# Patient Record
Sex: MALE | Race: WHITE | Employment: OTHER | URBAN - METROPOLITAN AREA
[De-identification: names, ages, dates, MRNs, and addresses within clinical notes are randomized per-mention and may not be internally consistent; named-entity substitution may affect disease eponyms.]

---

## 2017-02-08 ENCOUNTER — TRANSCRIBE ORDERS (OUTPATIENT)
Dept: ADMINISTRATIVE | Age: 57
End: 2017-02-08

## 2017-02-08 ENCOUNTER — APPOINTMENT (OUTPATIENT)
Dept: LAB | Age: 57
End: 2017-02-08
Payer: COMMERCIAL

## 2017-02-08 DIAGNOSIS — E78.5 HYPERLIPIDEMIA, UNSPECIFIED HYPERLIPIDEMIA TYPE: ICD-10-CM

## 2017-02-08 DIAGNOSIS — I10 UNSPECIFIED ESSENTIAL HYPERTENSION: ICD-10-CM

## 2017-02-08 DIAGNOSIS — E78.5 HYPERLIPIDEMIA, UNSPECIFIED HYPERLIPIDEMIA TYPE: Primary | ICD-10-CM

## 2017-02-08 DIAGNOSIS — I10 ESSENTIAL (PRIMARY) HYPERTENSION: ICD-10-CM

## 2017-02-08 DIAGNOSIS — R73.09 OTHER ABNORMAL GLUCOSE: ICD-10-CM

## 2017-02-08 DIAGNOSIS — E78.5 HYPERLIPIDEMIA: ICD-10-CM

## 2017-02-08 LAB
ALBUMIN SERPL BCP-MCNC: 4 G/DL (ref 3.5–5)
ALP SERPL-CCNC: 65 U/L (ref 46–116)
ALT SERPL W P-5'-P-CCNC: 41 U/L (ref 12–78)
ANION GAP SERPL CALCULATED.3IONS-SCNC: 4 MMOL/L (ref 4–13)
AST SERPL W P-5'-P-CCNC: 25 U/L (ref 5–45)
BILIRUB SERPL-MCNC: 0.65 MG/DL (ref 0.2–1)
BUN SERPL-MCNC: 16 MG/DL (ref 5–25)
CALCIUM SERPL-MCNC: 8.9 MG/DL (ref 8.3–10.1)
CHLORIDE SERPL-SCNC: 107 MMOL/L (ref 100–108)
CHOLEST SERPL-MCNC: 161 MG/DL (ref 50–200)
CO2 SERPL-SCNC: 27 MMOL/L (ref 21–32)
CREAT SERPL-MCNC: 1.18 MG/DL (ref 0.6–1.3)
CREAT UR-MCNC: 180 MG/DL
EST. AVERAGE GLUCOSE BLD GHB EST-MCNC: 123 MG/DL
GFR SERPL CREATININE-BSD FRML MDRD: >60 ML/MIN/1.73SQ M
GLUCOSE SERPL-MCNC: 101 MG/DL (ref 65–140)
HBA1C MFR BLD: 5.9 % (ref 4.2–6.3)
HDLC SERPL-MCNC: 54 MG/DL (ref 40–60)
LDLC SERPL DIRECT ASSAY-MCNC: 91 MG/DL (ref 0–100)
MICROALBUMIN UR-MCNC: 8.1 MG/L (ref 0–20)
MICROALBUMIN/CREAT 24H UR: 5 MG/G CREATININE (ref 0–30)
POTASSIUM SERPL-SCNC: 4.1 MMOL/L (ref 3.5–5.3)
PROT SERPL-MCNC: 7.5 G/DL (ref 6.4–8.2)
SODIUM SERPL-SCNC: 138 MMOL/L (ref 136–145)
TRIGL SERPL-MCNC: 153 MG/DL

## 2017-02-08 PROCEDURE — 36415 COLL VENOUS BLD VENIPUNCTURE: CPT

## 2017-02-08 PROCEDURE — 80061 LIPID PANEL: CPT

## 2017-02-08 PROCEDURE — 83036 HEMOGLOBIN GLYCOSYLATED A1C: CPT

## 2017-02-08 PROCEDURE — 82570 ASSAY OF URINE CREATININE: CPT

## 2017-02-08 PROCEDURE — 82043 UR ALBUMIN QUANTITATIVE: CPT

## 2017-02-08 PROCEDURE — 80053 COMPREHEN METABOLIC PANEL: CPT

## 2017-02-08 PROCEDURE — 83721 ASSAY OF BLOOD LIPOPROTEIN: CPT

## 2017-02-10 ENCOUNTER — GENERIC CONVERSION - ENCOUNTER (OUTPATIENT)
Dept: OTHER | Facility: OTHER | Age: 57
End: 2017-02-10

## 2017-02-15 ENCOUNTER — GENERIC CONVERSION - ENCOUNTER (OUTPATIENT)
Dept: OTHER | Facility: OTHER | Age: 57
End: 2017-02-15

## 2017-02-16 ENCOUNTER — ALLSCRIPTS OFFICE VISIT (OUTPATIENT)
Dept: OTHER | Facility: OTHER | Age: 57
End: 2017-02-16

## 2017-06-08 ENCOUNTER — GENERIC CONVERSION - ENCOUNTER (OUTPATIENT)
Dept: OTHER | Facility: OTHER | Age: 57
End: 2017-06-08

## 2017-08-07 DIAGNOSIS — E78.5 HYPERLIPIDEMIA: ICD-10-CM

## 2017-08-07 DIAGNOSIS — I10 ESSENTIAL (PRIMARY) HYPERTENSION: ICD-10-CM

## 2017-08-07 DIAGNOSIS — R73.03 PREDIABETES: ICD-10-CM

## 2017-08-10 ENCOUNTER — APPOINTMENT (OUTPATIENT)
Dept: LAB | Age: 57
End: 2017-08-10
Payer: COMMERCIAL

## 2017-08-10 ENCOUNTER — TRANSCRIBE ORDERS (OUTPATIENT)
Dept: ADMINISTRATIVE | Age: 57
End: 2017-08-10

## 2017-08-10 DIAGNOSIS — E78.5 HYPERLIPIDEMIA: ICD-10-CM

## 2017-08-10 DIAGNOSIS — I10 ESSENTIAL (PRIMARY) HYPERTENSION: ICD-10-CM

## 2017-08-10 DIAGNOSIS — R73.03 PREDIABETES: ICD-10-CM

## 2017-08-10 LAB
ALBUMIN SERPL BCP-MCNC: 3.9 G/DL (ref 3.5–5)
ALP SERPL-CCNC: 63 U/L (ref 46–116)
ALT SERPL W P-5'-P-CCNC: 26 U/L (ref 12–78)
ANION GAP SERPL CALCULATED.3IONS-SCNC: 6 MMOL/L (ref 4–13)
AST SERPL W P-5'-P-CCNC: 22 U/L (ref 5–45)
BILIRUB SERPL-MCNC: 1.07 MG/DL (ref 0.2–1)
BUN SERPL-MCNC: 22 MG/DL (ref 5–25)
CALCIUM SERPL-MCNC: 9.4 MG/DL (ref 8.3–10.1)
CHLORIDE SERPL-SCNC: 104 MMOL/L (ref 100–108)
CHOLEST SERPL-MCNC: 153 MG/DL (ref 50–200)
CO2 SERPL-SCNC: 28 MMOL/L (ref 21–32)
CREAT SERPL-MCNC: 1.17 MG/DL (ref 0.6–1.3)
CREAT UR-MCNC: 291 MG/DL
EST. AVERAGE GLUCOSE BLD GHB EST-MCNC: 128 MG/DL
GFR SERPL CREATININE-BSD FRML MDRD: 69 ML/MIN/1.73SQ M
GLUCOSE P FAST SERPL-MCNC: 99 MG/DL (ref 65–99)
HBA1C MFR BLD: 6.1 % (ref 4.2–6.3)
HDLC SERPL-MCNC: 52 MG/DL (ref 40–60)
LDLC SERPL DIRECT ASSAY-MCNC: 88 MG/DL (ref 0–100)
MICROALBUMIN UR-MCNC: 12.6 MG/L (ref 0–20)
MICROALBUMIN/CREAT 24H UR: 4 MG/G CREATININE (ref 0–30)
POTASSIUM SERPL-SCNC: 4.7 MMOL/L (ref 3.5–5.3)
PROT SERPL-MCNC: 7.4 G/DL (ref 6.4–8.2)
SODIUM SERPL-SCNC: 138 MMOL/L (ref 136–145)
TRIGL SERPL-MCNC: 111 MG/DL

## 2017-08-10 PROCEDURE — 83036 HEMOGLOBIN GLYCOSYLATED A1C: CPT

## 2017-08-10 PROCEDURE — 83721 ASSAY OF BLOOD LIPOPROTEIN: CPT

## 2017-08-10 PROCEDURE — 82570 ASSAY OF URINE CREATININE: CPT

## 2017-08-10 PROCEDURE — 80061 LIPID PANEL: CPT

## 2017-08-10 PROCEDURE — 36415 COLL VENOUS BLD VENIPUNCTURE: CPT

## 2017-08-10 PROCEDURE — 82043 UR ALBUMIN QUANTITATIVE: CPT

## 2017-08-10 PROCEDURE — 80053 COMPREHEN METABOLIC PANEL: CPT

## 2017-08-18 ENCOUNTER — ALLSCRIPTS OFFICE VISIT (OUTPATIENT)
Dept: OTHER | Facility: OTHER | Age: 57
End: 2017-08-18

## 2017-12-01 ENCOUNTER — GENERIC CONVERSION - ENCOUNTER (OUTPATIENT)
Dept: INTERNAL MEDICINE CLINIC | Facility: CLINIC | Age: 57
End: 2017-12-01

## 2017-12-10 NOTE — PROGRESS NOTES
Assessment    1  Hypertension (401 9) (I10)   2  Hyperlipidemia (272 4) (E78 5)   3  Prediabetes (790 29) (R73 03)   4  SVT (supraventricular tachycardia) (427 89) (I47 1)    #1 ocular issues-was originally referred to [facility] with 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-physician there did not feel this was a case it felt he may of had an area that had some scarring with resultant trichasis-they stopped suppressive doxycycline that he had been on any remains off that  #2 prediabetes-hemoglobin A1c climbed to 6 4-on metformin down to 6 1  #3 edema-etiology amlodipine-resolved off amlodipine  #4 hypertension-had climbed creatinine higher dose of lisinopril  He felt ACE inhibitor led to cough  Amlodipine caused edema  Now on low-dose Cozaar and doing well  He is a history of SVT but prefers not to use beta blockers  #5 abdominal pain-in 2 occasions have episodes lasting several hours without associated vomiting or diarrhea  Both episodes umbilical   Has not had any abdominal surgeries  Episodes have resolved  #6 inflammatory bowel disease had colonoscopy suspicious for mild Crohn's  Recent colonoscopy done in June shows mild Crohn's  Monitored by Dr Margarita Horn   They told him to continue current meds and have repeat Klonopin 2 years which would be December 2019  #7 mild elevation of creatinine-ultrasound the kidneys normal   Urinalysis normal   Urine for eosinophils negative  Normalized and low dose of ACE inhibitor  #8 paroxysmal atrial fibrillation-cardioverted in the past   Asymptomatic times months  #9 hyperlipidemia-stable on current dose of Crestor  Also on fish oil  #9 polyps of the colon-had a polyp removed at his colostomy in May 2016    Klonopin 2017 just done-as noted for repeat Klonopin 2 years    All other problems as per note of November 2007, 2000, October 1994 in [address] [facility] [address]: Generic Cozaar 50 mg daily, Crestor 5 mg 3 days per week,Delzicol-400 mg 3 times a day, metformin 500 mg twice a day, baby aspirin twice per week, vitamin B-12, multivitamin and fiber with breakfast, glucosamine 3 days per week, fish oil 5 days per week    Appointment in 6 months or prior chemistry profile, cholesterol profile, A1c and urine for microalbumin  Prior to the ensuing visit will also need B-12 level     Plan  Continue current medical regimen  Go for blood work prior to next visit  Call office if noting any chest pain or pressure with activity  Call office if noting any weakness of one arm or leg compared to the other  Call office if noting any numbness of one arm or leg compared to the other  Call office if noting any blurred or double vision     History of Present Illness  HPI: Doing well overall  We reviewed prediabetes versus diabetes  A1c is 6 1 and his urine for microalbumin is negative  He remains on metformin thousand milligrams a day    He is known hypertension  BP adequate control on current regimen  No adjustments made in that regard  He avoids all the decongestants  Denies simultaneous pounding of heart sweats and headache    Data follow-up visit with colorectal physician-colonoscopy done showing minimal colitis  He was told to stay on current dose of meds and have repeat colonoscopy 2 years  Other labs show cholesterol 153 HDL 52 LDL 88 triglycerides 111 creatinine 1 17  Urine for microalbumin negative  We reviewed complications of diabetes including microvascular and macrovascular and he understands  He sees ophthalmology yearly  He has not shown any evidence of microalbuminuria    Remains in a statin  He denies any major statin associated myalgias  This patient denies any systemic symptoms  Specifically there has been no evidence of fever, night sweats, significant weight loss or significant decrease in appetite  Review of Systems  Complete-Male:   Constitutional: No fever or chills, feels well, no tiredness, no recent weight gain or weight loss     Eyes: No complaints of eye pain, no red eyes, no discharge from eyes, no itchy eyes  ENT: no complaints of earache, no hearing loss, no nosebleeds, no nasal discharge, no sore throat, no hoarseness  Cardiovascular: lower extremity edema, but the heart rate was not slow, no chest pain, no intermittent leg claudication, the heart rate was not fast and no palpitations  Respiratory: No complaints of shortness of breath, no wheezing, no cough, no SOB on exertion, no orthopnea or PND  Gastrointestinal: abdominal pain and bloody stools, but no nausea, no vomiting, no constipation and no diarrhea  Genitourinary: No complaints of dysuria, no incontinence, no hesitancy, no nocturia, no genital lesion, no testicular pain  Musculoskeletal: No complaints of arthralgia, no myalgias, no joint swelling or stiffness, no limb pain or swelling  Integumentary: No complaints of skin rash or skin lesions, no itching, no skin wound, no dry skin  Neurological: No compliants of headache, no confusion, no convulsions, no numbness or tingling, no dizziness or fainting, no limb weakness, no difficulty walking  Psychiatric: Is not suicidal, no sleep disturbances, no anxiety or depression, no change in personality, no emotional problems  Endocrine: No complaints of proptosis, no hot flashes, no muscle weakness, no erectile dysfunction, no deepening of the voice, no feelings of weakness  Hematologic/Lymphatic: No complaints of swollen glands, no swollen glands in the neck, does not bleed easily, no easy bruising  Active Problems    1  Allergic rhinitis (477 9) (J30 9)   2  Azotemia (790 6) (R79 89)   3  Benign enlargement of prostate (600 00) (N40 0)   4  Colitis (558 9) (K52 9)   5  Colonoscopy (Fiberoptic) Screening   6  Edema (782 3) (R60 9)   7  Encounter for screening colonoscopy (V76 51) (Z12 11)   8  Hyperlipidemia (272 4) (E78 5)   9  Hypertension (401 9) (I10)   10  Palpitations (785 1) (R00 2)   11   SVT (supraventricular tachycardia) (427 89) (I47 1)    Past Medical History    1  History of Laceration Of Lower Leg (891 0)  Active Problems And Past Medical History Reviewed: The active problems and past medical history were reviewed and updated today  Surgical History  Surgical History Reviewed: The surgical history was reviewed and updated today  Family History  Mother    1  No pertinent family history    Social History    · Never smoker   · Occasional alcohol use  Social History Reviewed: The social history was reviewed and updated today  The social history was reviewed and is unchanged  Current Meds   1  Aspirin Low Dose 81 MG TABS; Therapy: (Recorded:50Cyt6456) to Recorded   2  Cozaar 50 MG Oral Tablet (Losartan Potassium); TAKE ONE TABLET BY MOUTH ONCE DAILY; Therapy: (Recorded:47Haa7633) to Recorded   3  Doxycycline Hyclate 20 MG Oral Tablet; Therapy: (Recorded:37Onz3387) to Recorded   4  Fluticasone Propionate 50 MCG/ACT Nasal Suspension; 2 squirts in each nostril once daily; Therapy: 03Qoe7003 to (Last Rx:65Muq3041)  Requested for: 24Gxq1443 Ordered   5  FreeStyle Test In Vitro Strip; patient tests blood sugars twice a day; Therapy: 66TAD3919 to (Last AC:55XPJ7910)  Requested for: 92DHQ6539 Ordered   6  Losartan Potassium 50 MG Oral Tablet; take 1 tablet by mouth daily; Therapy: 29SPZ8992 to (Evaluate:05Nov2017)  Requested for: 96DUD7535; Last Rx:06Dgc1259   Ordered   7  MetFORMIN HCl - 500 MG Oral Tablet; TAKE 1 TABLET TWICE DAILY; Therapy: (Recorded:75Zsx4439) to Recorded   8  Multivitamins TABS; Therapy: (Recorded:04Klo4469) to Recorded   9  Probiotic CAPS; Therapy: (Recorded:88Brr2628) to Recorded   10  Vitamin B-12 TABS; Therapy: (Recorded:54Hoa5265) to Recorded  Medication List Reviewed: The medication list was reviewed and updated today  Allergies    1   No Known Drug Allergies    Vitals  Vital Signs    Recorded: 18Aug2017 08:34AM Recorded: 18Aug2017 08:03AM   Heart Rate 68    Respiration 14 Systolic 589, RLE, Sitting    Diastolic 76, RLE, Sitting    Height  6 ft 1 in   Weight  196 lb 8 oz   BMI Calculated  25 93   BSA Calculated  2 14     Physical Exam    Constitutional   General appearance: No acute distress, well appearing and well nourished  Head and Face   Head and face: Normal     Palpation of the face and sinuses: No sinus tenderness  Eyes   Conjunctiva and lids: No erythema, swelling or discharge  Pupils and irises: Equal, round, reactive to light  Ears, Nose, Mouth, and Throat   External inspection of ears and nose: Normal     Otoscopic examination: Tympanic membranes translucent with normal light reflex  Canals patent without erythema  Hearing: Normal     Nasal mucosa, septum, and turbinates: Normal without edema or erythema  Lips, teeth, and gums: Normal, good dentition  Oropharynx: Normal with no erythema, edema, exudate or lesions  Neck   Neck: Supple, symmetric, trachea midline, no masses  Thyroid: Normal, no thyromegaly  Pulmonary   Respiratory effort: No increased work of breathing or signs of respiratory distress  Percussion of chest: Normal     Palpation of chest: Normal     Auscultation of lungs: Clear to auscultation  Cardiovascular   Palpation of heart: Normal PMI, no thrills  Auscultation of heart: Normal rate and rhythm, normal S1 and S2, no murmurs  Carotid pulses: 2+ bilaterally  Abdominal aorta: Normal     Femoral pulses: 2+ bilaterally  Pedal pulses: 2+ bilaterally  Peripheral vascular exam: Normal     Examination of extremities for edema and/or varicosities: Normal     Chest   Breasts: Normal, no dimpling or skin changes appreciated  Palpation of breasts and axillae: Normal, no masses palpated  Chest: Normal     Abdomen   Abdomen: Non-tender, no masses  Liver and spleen: No hepatomegaly or splenomegaly  Examination for hernias: No hernias appreciated      Anus, perineum, and rectum: Normal sphincter tone, no masses, no prolapse  Stool sample for occult blood: Negative  Genitourinary   Scrotal contents: Normal testes, no masses  Penis: Normal, no lesions  Lymphatic   Palpation of lymph nodes in neck: No lymphadenopathy  Palpation of lymph nodes in axillae: No lymphadenopathy  Palpation of lymph nodes in groin: No lymphadenopathy  Palpation of lymph nodes in other areas: No lymphadenopathy  Musculoskeletal   Gait and station: Normal     Inspection/palpation of digits and nails: Normal without clubbing or cyanosis  Inspection/palpation of joints, bones, and muscles: Normal     Range of motion: Normal     Stability: Normal     Muscle strength/tone: Normal     Skin   Skin and subcutaneous tissue: Normal without rashes or lesions  Palpation of skin and subcutaneous tissue: Normal turgor  Neurologic   Cranial nerves: Cranial nerves 2-12 intact  Reflexes: 2+ and symmetric  Sensation: No sensory loss  Psychiatric   Judgment and insight: Normal     Orientation to person, place and time: Normal     Recent and remote memory: Intact      Mood and affect: Normal        Signatures   Electronically signed by : KEE Breaux ; Aug 18 2017  8:42AM EST                       (Author)

## 2018-01-13 VITALS
HEIGHT: 73 IN | SYSTOLIC BLOOD PRESSURE: 124 MMHG | WEIGHT: 196.5 LBS | BODY MASS INDEX: 26.04 KG/M2 | HEART RATE: 68 BPM | RESPIRATION RATE: 14 BRPM | DIASTOLIC BLOOD PRESSURE: 76 MMHG

## 2018-01-13 VITALS
WEIGHT: 201.38 LBS | HEIGHT: 73 IN | DIASTOLIC BLOOD PRESSURE: 76 MMHG | RESPIRATION RATE: 14 BRPM | BODY MASS INDEX: 26.69 KG/M2 | HEART RATE: 68 BPM | SYSTOLIC BLOOD PRESSURE: 126 MMHG

## 2018-02-16 LAB
25(OH)D3 SERPL-MCNC: 40 NG/ML (ref 30–100)
ALBUMIN SERPL-MCNC: 4.4 G/DL (ref 3.6–5.1)
ALBUMIN/CREAT UR: 2 MCG/MG CREAT
ALBUMIN/GLOB SERPL: 1.9 (CALC) (ref 1–2.5)
ALP SERPL-CCNC: 63 U/L (ref 40–115)
ALT SERPL-CCNC: 24 U/L (ref 9–46)
AST SERPL-CCNC: 19 U/L (ref 10–35)
BILIRUB SERPL-MCNC: 0.6 MG/DL (ref 0.2–1.2)
BUN SERPL-MCNC: 23 MG/DL (ref 7–25)
BUN/CREAT SERPL: NORMAL (CALC) (ref 6–22)
CALCIUM SERPL-MCNC: 9.3 MG/DL (ref 8.6–10.3)
CHLORIDE SERPL-SCNC: 103 MMOL/L (ref 98–110)
CHOLEST SERPL-MCNC: 153 MG/DL
CO2 SERPL-SCNC: 28 MMOL/L (ref 20–31)
CREAT SERPL-MCNC: 1.11 MG/DL (ref 0.7–1.33)
CREAT UR-MCNC: 213 MG/DL (ref 20–370)
GLOBULIN SER CALC-MCNC: 2.3 G/DL (CALC) (ref 1.9–3.7)
GLUCOSE SERPL-MCNC: 98 MG/DL (ref 65–99)
HBA1C MFR BLD: 5.7 % OF TOTAL HGB
HDLC SERPL-MCNC: 45 MG/DL
LDLC SERPL DIRECT ASSAY-MCNC: 78 MG/DL
MICROALBUMIN UR-MCNC: 0.5 MG/DL
POTASSIUM SERPL-SCNC: 4.7 MMOL/L (ref 3.5–5.3)
PROT SERPL-MCNC: 6.7 G/DL (ref 6.1–8.1)
PSA SERPL-MCNC: 0.6 NG/ML
SL AMB EGFR AFRICAN AMERICAN: 85 ML/MIN/1.73M2
SL AMB EGFR NON AFRICAN AMERICAN: 73 ML/MIN/1.73M2
SODIUM SERPL-SCNC: 138 MMOL/L (ref 135–146)
TRIGL SERPL-MCNC: 167 MG/DL

## 2018-02-17 PROBLEM — I47.10 SVT (SUPRAVENTRICULAR TACHYCARDIA): Status: ACTIVE | Noted: 2017-02-16

## 2018-02-17 PROBLEM — I47.1 SVT (SUPRAVENTRICULAR TACHYCARDIA) (HCC): Status: ACTIVE | Noted: 2017-02-16

## 2018-02-17 PROBLEM — I47.10 SVT (SUPRAVENTRICULAR TACHYCARDIA): Chronic | Status: ACTIVE | Noted: 2017-02-16

## 2018-02-17 PROBLEM — I47.1 SVT (SUPRAVENTRICULAR TACHYCARDIA) (HCC): Chronic | Status: ACTIVE | Noted: 2017-02-16

## 2018-02-22 ENCOUNTER — OFFICE VISIT (OUTPATIENT)
Dept: INTERNAL MEDICINE CLINIC | Facility: CLINIC | Age: 58
End: 2018-02-22
Payer: COMMERCIAL

## 2018-02-22 VITALS
WEIGHT: 198.8 LBS | HEIGHT: 73 IN | RESPIRATION RATE: 14 BRPM | HEART RATE: 72 BPM | BODY MASS INDEX: 26.35 KG/M2 | DIASTOLIC BLOOD PRESSURE: 78 MMHG | SYSTOLIC BLOOD PRESSURE: 120 MMHG

## 2018-02-22 DIAGNOSIS — E78.5 HYPERLIPIDEMIA, UNSPECIFIED HYPERLIPIDEMIA TYPE: Primary | ICD-10-CM

## 2018-02-22 DIAGNOSIS — I10 HYPERTENSION, UNSPECIFIED TYPE: ICD-10-CM

## 2018-02-22 DIAGNOSIS — R73.03 PREDIABETES: ICD-10-CM

## 2018-02-22 DIAGNOSIS — I47.1 SVT (SUPRAVENTRICULAR TACHYCARDIA) (HCC): Chronic | ICD-10-CM

## 2018-02-22 PROCEDURE — 99214 OFFICE O/P EST MOD 30 MIN: CPT | Performed by: INTERNAL MEDICINE

## 2018-02-22 RX ORDER — MESALAMINE 400 MG/1
800 CAPSULE, DELAYED RELEASE ORAL 3 TIMES DAILY
Refills: 4 | COMMUNITY
Start: 2017-11-28 | End: 2018-12-19 | Stop reason: SDUPTHER

## 2018-02-22 RX ORDER — LOSARTAN POTASSIUM 50 MG/1
1 TABLET ORAL DAILY
COMMUNITY
End: 2018-10-24 | Stop reason: SDUPTHER

## 2018-02-22 RX ORDER — DOXYCYCLINE HYCLATE 20 MG
TABLET ORAL
COMMUNITY
End: 2018-09-13 | Stop reason: ALTCHOICE

## 2018-02-22 RX ORDER — FLUTICASONE PROPIONATE 50 MCG
SPRAY, SUSPENSION (ML) NASAL
COMMUNITY
Start: 2015-04-09

## 2018-02-22 RX ORDER — CYCLOSPORINE 0.5 MG/ML
EMULSION OPHTHALMIC ONCE
Refills: 3 | COMMUNITY
Start: 2017-12-14 | End: 2021-12-15 | Stop reason: ALTCHOICE

## 2018-02-22 RX ORDER — FLUOROURACIL 50 MG/G
CREAM TOPICAL
Refills: 1 | COMMUNITY
Start: 2017-12-28 | End: 2018-09-13 | Stop reason: ALTCHOICE

## 2018-02-22 NOTE — PROGRESS NOTES
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 -physician there felt this was not the case and he had an area of scarring with resultanttrichasis- -she had been on suppressive doxycycline but this was stopped  2  Pre diabetes -A1c improved to 5 7  Continue pro pH diet and exercise  He is also on metformin 1 gram daily  3  Edema -etiology amlodipine -resolved off amlodipine  4  Hypertension -creatinine had climbed on higher dose of ACE-inhibitor  ACE-inhibitor also let the cough  Amlodipine led to edema  Now stable on low-dose Cozaar and doing well  He has a history of SVT but prefers not to use beta-blocker  5  Abdominal pain -on 2 occasions had episodes lasting several hours without associated vomiting or diarrhea  Both her umbilical   He has not had any abdominal surgeries  Has had no further symptoms  6  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  Had most recent colonoscopy 2017  Subsequent colonoscopy will be due in approximately December 2019  His dose of meds has been decreased  7  Mild elevation of creatinine -ultrasound the kidneys normal   Urine for eosinophiles negative  Urinalysis normal   Creatinine normalized on lower dose of ACE-inhibitor as noted now on angiotensin receptor blocker  8  Paroxysmal atrial fibrillation -cardioverted in the past   Asymptomatic for several years  9  Hyperlipidemia -stable on current dose of Crestor  Also on fish oil  10  Polyps of the colon-had a polyp removed at his colonoscopy in May 2016  Colonoscopy done in 2017   Continues with periodic assessment via Colorectal group    All other problems as per note of November 2007, 2000, October 1990 04/19/1993       MEDICAL REGIMEN:      Generic Cozaar 50 milligrams daily, Crestor 5 milligrams 3 days per weekDe,lzicol- 400 milligrams t i d , metformin 500 milligrams b i d , baby aspirin twice per week, vitamin B12 a1000 micrograms daily, multivitamin with fiber with breakfast, glucosamine and fish oil    Appointment in 6 months with prior chemistry profile, cholesterol profile, A1c  No problem-specific Assessment & Plan notes found for this encounter  Diagnoses and all orders for this visit:    Hyperlipidemia, unspecified hyperlipidemia type  -     Comprehensive metabolic panel; Future  -     Cholesterol, total; Future  -     LDL cholesterol, direct; Future  -     HDL cholesterol; Future  -     Triglycerides; Future  -     Hemoglobin A1c; Future  -     Microalbumin,Urine  -     Vitamin B12; Future  -     Comprehensive metabolic panel  -     Cholesterol, total  -     LDL cholesterol, direct  -     HDL cholesterol  -     Triglycerides  -     Hemoglobin A1c  -     Vitamin B12    Prediabetes  -     Comprehensive metabolic panel; Future  -     Cholesterol, total; Future  -     LDL cholesterol, direct; Future  -     HDL cholesterol; Future  -     Triglycerides; Future  -     Hemoglobin A1c; Future  -     Microalbumin,Urine  -     Vitamin B12; Future  -     Comprehensive metabolic panel  -     Cholesterol, total  -     LDL cholesterol, direct  -     HDL cholesterol  -     Triglycerides  -     Hemoglobin A1c  -     Vitamin B12    Hypertension, unspecified type  -     Comprehensive metabolic panel; Future  -     Cholesterol, total; Future  -     LDL cholesterol, direct; Future  -     HDL cholesterol; Future  -     Triglycerides; Future  -     Hemoglobin A1c; Future  -     Microalbumin,Urine  -     Vitamin B12; Future  -     Comprehensive metabolic panel  -     Cholesterol, total  -     LDL cholesterol, direct  -     HDL cholesterol  -     Triglycerides  -     Hemoglobin A1c  -     Vitamin B12    SVT (supraventricular tachycardia) (HCC)    Other orders  -     aspirin (ASPIRIN LOW DOSE) 81 MG tablet; Take by mouth  -     losartan (COZAAR) 50 mg tablet;  Take 1 tablet by mouth daily  -     RESTASIS 0 05 % ophthalmic emulsion; INSTILL 1 DROP INTO THE AFFECTED EYE(S) TWICE A DAY  -     doxycycline (PERIOSTAT) 20 MG tablet; Take by mouth  -     fluorouracil (EFUDEX) 5 % cream; APPLY TO AFFECTED AREA TWICE A DAY FOR 2 WEEKS  -     fluticasone (FLONASE) 50 mcg/act nasal spray; into each nostril  -     DELZICOL 400 MG; Take 800 mg by mouth 3 (three) times a day  -     metFORMIN (GLUCOPHAGE) 500 mg tablet; Take 500 mg by mouth 2 (two) times a day  -     metroNIDAZOLE (METROCREAM) 0 75 % cream; APPLY TO MID FACE ONCE TO TWICE DAILY          Subjective:      Patient ID: Ivan Platt is a 62 y o  male  He is overall relatively stable  He has known pre diabetes  A1c has improved from 6 1 down to 5 7  Gained 5 pounds over the winter which she typically does not triglycerides of climb  Alcohol intake is same  He is stable from his Colorectal viewpoint  His dose of meds has decreased by 50%  Other labs done show an LDL is 78 cholesterol 153 triglycerides 167 creatinine stable at 1 11  Urine for microalbumin negative  HDL 5  PSA 0 6  Vitamin-D therapeutic at 40  We talked about the new zoster vaccine  Definitive data regarding CAD should be available by the time the since patient is age 61 when he will need a vaccine  He did receive annual influenza vaccine  As noted his most recent colonoscopy was in June of 2017 any was told he does not need 1 for 2 years     He has known hypertension  BP adequately controlled on current regimen  Avoiding salt and decongestants  Denies hematemesis pounding of his heart sweats and headache  At this point will continue current dose of angiotensin receptor blocker  He has not had any recurrence of his arrhythmia  He denies any palpitations syncope or near syncope  In regular rhythm on examination today     He knows his preferred analgesic agent is acetaminophen as opposed to nonsteroidals because of his hypertension, prior mild elevation of creatinine  He stays well hydrated with activity  This patient denies any systemic symptoms  Specifically there has been no evidence of fever, night sweats, significant weight loss or significant decrease in appetite  The following portions of the patient's history were reviewed and updated as appropriate: current medications, past family history, past medical history, past social history, past surgical history and problem list     Review of Systems   Constitutional: Negative  Respiratory: Negative  Cardiovascular: Negative  Gastrointestinal: Negative  Endocrine: Negative  Genitourinary: Negative  Nocturia x2   Musculoskeletal: Negative  Neurological: Negative  Hematological: Negative  Psychiatric/Behavioral: Negative  Objective:      /78   Pulse 72   Resp 14   Ht 6' 1" (1 854 m)   Wt 90 2 kg (198 lb 12 8 oz)   BMI 26 23 kg/m²          Physical Exam   Constitutional: He is oriented to person, place, and time  He appears well-developed and well-nourished  No distress  HENT:   Head: Normocephalic and atraumatic  Right Ear: External ear normal    Left Ear: External ear normal    Nose: Nose normal    Mouth/Throat: Oropharynx is clear and moist  No oropharyngeal exudate  Eyes: Conjunctivae and EOM are normal  Pupils are equal, round, and reactive to light  Right eye exhibits no discharge  Left eye exhibits no discharge  No scleral icterus  Neck: Normal range of motion  Neck supple  No JVD present  No tracheal deviation present  No thyromegaly present  Cardiovascular: Normal rate, regular rhythm, normal heart sounds and intact distal pulses  Exam reveals no gallop and no friction rub  No murmur heard  Pulmonary/Chest: Effort normal and breath sounds normal  No stridor  No respiratory distress  He has no wheezes  He exhibits no tenderness  Abdominal: Soft  Bowel sounds are normal  He exhibits no distension and no mass  There is no tenderness  There is no rebound and no guarding  Genitourinary: Rectum normal and penis normal  Rectal exam shows guaiac negative stool  Genitourinary Comments:  Minimal enlargement of prostate without nodules   Musculoskeletal: Normal range of motion  He exhibits no edema, tenderness or deformity  Lymphadenopathy:     He has no cervical adenopathy  Neurological: He is alert and oriented to person, place, and time  He has normal reflexes  He displays normal reflexes  No cranial nerve deficit  He exhibits normal muscle tone  Coordination normal    Skin: Skin is warm and dry  No rash noted  He is not diaphoretic  No erythema  No pallor  Psychiatric: He has a normal mood and affect  His behavior is normal  Judgment and thought content normal    Vitals reviewed

## 2018-04-13 DIAGNOSIS — E11.9 TYPE 2 DIABETES MELLITUS WITHOUT COMPLICATION, WITHOUT LONG-TERM CURRENT USE OF INSULIN (HCC): Primary | ICD-10-CM

## 2018-09-07 LAB
ALBUMIN SERPL-MCNC: 4.4 G/DL (ref 3.6–5.1)
ALBUMIN/GLOB SERPL: 1.7 (CALC) (ref 1–2.5)
ALP SERPL-CCNC: 59 U/L (ref 40–115)
ALT SERPL-CCNC: 23 U/L (ref 9–46)
AST SERPL-CCNC: 19 U/L (ref 10–35)
BILIRUB SERPL-MCNC: 0.9 MG/DL (ref 0.2–1.2)
BUN SERPL-MCNC: 19 MG/DL (ref 7–25)
BUN/CREAT SERPL: NORMAL (CALC) (ref 6–22)
CALCIUM SERPL-MCNC: 9.4 MG/DL (ref 8.6–10.3)
CHLORIDE SERPL-SCNC: 104 MMOL/L (ref 98–110)
CHOLEST SERPL-MCNC: 181 MG/DL
CO2 SERPL-SCNC: 31 MMOL/L (ref 20–32)
CREAT SERPL-MCNC: 1.01 MG/DL (ref 0.7–1.33)
GLOBULIN SER CALC-MCNC: 2.6 G/DL (CALC) (ref 1.9–3.7)
GLUCOSE SERPL-MCNC: 98 MG/DL (ref 65–99)
HBA1C MFR BLD: 5.6 % OF TOTAL HGB
HDLC SERPL-MCNC: 48 MG/DL
LDLC SERPL DIRECT ASSAY-MCNC: 103 MG/DL
POTASSIUM SERPL-SCNC: 4.5 MMOL/L (ref 3.5–5.3)
PROT SERPL-MCNC: 7 G/DL (ref 6.1–8.1)
SL AMB EGFR AFRICAN AMERICAN: 95 ML/MIN/1.73M2
SL AMB EGFR NON AFRICAN AMERICAN: 82 ML/MIN/1.73M2
SODIUM SERPL-SCNC: 141 MMOL/L (ref 135–146)
TRIGL SERPL-MCNC: 154 MG/DL
VIT B12 SERPL-MCNC: 1905 PG/ML (ref 200–1100)

## 2018-09-13 ENCOUNTER — OFFICE VISIT (OUTPATIENT)
Dept: INTERNAL MEDICINE CLINIC | Facility: CLINIC | Age: 58
End: 2018-09-13
Payer: COMMERCIAL

## 2018-09-13 VITALS
BODY MASS INDEX: 26.37 KG/M2 | SYSTOLIC BLOOD PRESSURE: 120 MMHG | HEIGHT: 73 IN | DIASTOLIC BLOOD PRESSURE: 78 MMHG | RESPIRATION RATE: 14 BRPM | WEIGHT: 199 LBS | HEART RATE: 72 BPM

## 2018-09-13 DIAGNOSIS — N40.0 BENIGN PROSTATIC HYPERPLASIA, UNSPECIFIED WHETHER LOWER URINARY TRACT SYMPTOMS PRESENT: ICD-10-CM

## 2018-09-13 DIAGNOSIS — R73.03 PREDIABETES: ICD-10-CM

## 2018-09-13 DIAGNOSIS — E78.5 HYPERLIPIDEMIA, UNSPECIFIED HYPERLIPIDEMIA TYPE: Primary | ICD-10-CM

## 2018-09-13 DIAGNOSIS — I10 HYPERTENSION, UNSPECIFIED TYPE: ICD-10-CM

## 2018-09-13 DIAGNOSIS — C44.90 NONMELANOMA SKIN CANCER: Chronic | ICD-10-CM

## 2018-09-13 PROCEDURE — 99214 OFFICE O/P EST MOD 30 MIN: CPT | Performed by: INTERNAL MEDICINE

## 2018-09-13 PROCEDURE — 3078F DIAST BP <80 MM HG: CPT | Performed by: INTERNAL MEDICINE

## 2018-09-13 PROCEDURE — 3074F SYST BP LT 130 MM HG: CPT | Performed by: INTERNAL MEDICINE

## 2018-09-13 PROCEDURE — 3008F BODY MASS INDEX DOCD: CPT | Performed by: INTERNAL MEDICINE

## 2018-09-13 RX ORDER — DIPHENOXYLATE HYDROCHLORIDE AND ATROPINE SULFATE 2.5; .025 MG/1; MG/1
1 TABLET ORAL DAILY
COMMUNITY
End: 2020-02-12 | Stop reason: ALTCHOICE

## 2018-09-13 RX ORDER — ROSUVASTATIN CALCIUM 5 MG/1
5 TABLET, COATED ORAL
COMMUNITY
End: 2019-03-14 | Stop reason: SDUPTHER

## 2018-09-13 RX ORDER — LANOLIN ALCOHOL/MO/W.PET/CERES
CREAM (GRAM) TOPICAL DAILY
COMMUNITY

## 2018-09-13 NOTE — PROGRESS NOTES
Assessment/Plan:   1  Health maintenance -will be receiving influenza vaccine outside the office  2  Pre diabetes -A1c is improved all the way down to 5 6  Continue appropriate diet and exercise  Continue metformin 1    gram daily  For repeat A1c prior to next visit  3  Nonmelanoma skin cancer -await formal pathology report from Dermatology -she is scheduled for repeat surgery because margins are not clear  4  Hypertension -creatinine is climb slightly on higher dose of ACE-inhibitor  ACE-inhibitor also led to cough  Amlodipine led to edema  Now stable on low-dose angiotensin receptor blocker and doing well  He has a history of  SVT but prefers not to use beta-blocker   5  Ocular issues -originally referred [facility] can clinic with 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 -physician there felt this was not the case and he had an area of scarring with resultanttrichasis- -she had been on suppressive doxycycline but this was stopped  6  Edema -etiology amlodipine -resolved off amlodipin  7  Abdominal pain -on 2 occasions had episodes lasting several hours without associated vomiting or diarrhea  Both her umbilical   He has not had any abdominal surgeries  Has had no further symptoms  8 inflammatorybowel disease with endoscopy suspicious for mild clonus  Had most recent colonoscopy 2017  Subsequent colonoscopy will be due in approximately December 2019  His dose of meds has been decreased  9  Mild elevation of creatinine -ultrasound the kidneys normal   Urine for eosinophiles negative  Urinalysis normal   Creatinine normalized on lower dose of ACE-inhibitor as noted now on angiotensin receptor blocker  10  Paroxysmal atrial fibrillation -cardioverted in the past   Asymptomatic for several years  11  Hyperlipidemia -stable on current dose of Crestor  Also on fish oil -she will increase to 1 gram daily because of elevated triglycerides  He also knows he needs to lose weight  12   Polyps of the colon-had a polyp removed at his colonoscopy in May 2016  Colonoscopy done in 2017  Continues with periodic assessment via [facility]         MEDICAL REGIMEN:      Losartan 50 milligrams daily, Crestor 5 milligrams 3 days per week,Delzicol 40 milligrams t i d , metformin 500 milligrams b i d , baby aspirin twice per week, vitamin B12/ 1000 micrograms daily, multivitamin, glucosamine and 1 gram of fish oil daily    Appointment in 6 months with prior chemistry profile, cholesterol profile, A1c  No problem-specific Assessment & Plan notes found for this encounter  Diagnoses and all orders for this visit:    Hyperlipidemia, unspecified hyperlipidemia type  -     Comprehensive metabolic panel; Future  -     Cholesterol, total; Future  -     HDL cholesterol; Future  -     LDL cholesterol, direct; Future  -     Triglycerides; Future  -     HEMOGLOBIN A1C W/ EAG ESTIMATION; Future  -     Comprehensive metabolic panel  -     Cholesterol, total  -     HDL cholesterol  -     LDL cholesterol, direct  -     Triglycerides    Hypertension, unspecified type  -     Comprehensive metabolic panel; Future  -     Cholesterol, total; Future  -     HDL cholesterol; Future  -     LDL cholesterol, direct; Future  -     Triglycerides; Future  -     HEMOGLOBIN A1C W/ EAG ESTIMATION; Future  -     Comprehensive metabolic panel  -     Cholesterol, total  -     HDL cholesterol  -     LDL cholesterol, direct  -     Triglycerides    Prediabetes  -     Comprehensive metabolic panel; Future  -     Cholesterol, total; Future  -     HDL cholesterol; Future  -     LDL cholesterol, direct; Future  -     Triglycerides; Future  -     HEMOGLOBIN A1C W/ EAG ESTIMATION; Future  -     Comprehensive metabolic panel  -     Cholesterol, total  -     HDL cholesterol  -     LDL cholesterol, direct  -     Triglycerides    Nonmelanoma skin cancer    Other orders  -     rosuvastatin (CRESTOR) 5 mg tablet;  Take 5 mg by mouth Take 1 tab three times weekly  - cyanocobalamin (VITAMIN B-12) 1,000 mcg tablet; Take by mouth daily  -     multivitamin (THERAGRAN) TABS; Take 1 tablet by mouth daily          Subjective:      Patient ID: Kira Carnes is a 62 y o  male  His A1c continues to improve and is now 5 6  Other labs done prior to this visit show B12 level normal triglycerides 154 HDL 48  cholesterol 181 creatinine 1 01 with fasting sugar that day of 98  He is currently on metformin a gram daily  He says he is not having significant alcohol use  He will trying to lose weight and increase his fish oil to 1 daily for treatment of his elevated triglycerides  He remains on B12 supplementation  He wants to know about link between metformin and dementia  We reviewed that B12 deficiency has resulted metformin can lead to dementia  He has known hypertension  BP adequately controlled on current regimen  Avoiding salt and decongestants  Denies hematemesis pounding of his heart sweats and headache  He has known hyperlipidemia with abnormalities of the HDL triglycerides as well as LDL axis  Currently on Crestor 3 days per week  He is noted phlegm a queta bowel disease and remains on therapy via the [facility]  That dosing has not changed  This patient denies any systemic symptoms  Specifically there has been no evidence of fever, night sweats, significant weight loss or significant decrease in appetite  He will be obtaining influenza vaccine outside the office  We talked about that as well  This patient wanted to know their preferred analgesic agent  Because of their various comorbidities I recommended that this be acetaminophen  This patient has no history of chronic liver disease that would put them at greater risk for use of acetaminophen   This patient may use up to 500-650 mg of acetaminophen at a time and no more than 3 g a day total  Nonsteroidal anti-inflammatory agents have the potential to exacerbate hypertension, hypercoagulability, chronic renal failure, congestive heart failure, and various allergic tendencies  Because of his comorbidities we wanted to use acetaminophen rather than nonsteroidals    He had a lesion removed by dermatology was told was a nonmelanoma skin cancer  He does not know if it was basal cell or squamous cell        The following portions of the patient's history were reviewed and updated as appropriate: current medications, past family history, past medical history, past social history, past surgical history and problem list     Review of Systems   Constitutional: Negative  Respiratory: Negative  Cardiovascular: Negative  Gastrointestinal: Negative  Endocrine: Negative  Genitourinary: Negative  Musculoskeletal: Negative  Neurological: Negative  Hematological: Negative  Psychiatric/Behavioral: Negative  Objective:      /78   Pulse 72   Resp 14   Ht 6' 1" (1 854 m)   Wt 90 3 kg (199 lb)   BMI 26 25 kg/m²          Physical Exam   Constitutional: He is oriented to person, place, and time  He appears well-developed and well-nourished  No distress  HENT:   Head: Normocephalic and atraumatic  Right Ear: External ear normal    Left Ear: External ear normal    Nose: Nose normal    Mouth/Throat: Oropharynx is clear and moist  No oropharyngeal exudate  Eyes: Conjunctivae and EOM are normal  Pupils are equal, round, and reactive to light  Right eye exhibits no discharge  Left eye exhibits no discharge  No scleral icterus  Neck: No JVD present  No tracheal deviation present  No thyromegaly present  Cardiovascular: Normal rate, regular rhythm, normal heart sounds and intact distal pulses  Exam reveals no gallop and no friction rub  No murmur heard  Pulmonary/Chest: Effort normal and breath sounds normal  No stridor  No respiratory distress  He has no wheezes  He exhibits no tenderness     Abdominal: Bowel sounds are normal  He exhibits no distension and no mass  There is no tenderness  There is no rebound and no guarding  Genitourinary: Penis normal  Rectal exam shows guaiac negative stool  Musculoskeletal: Normal range of motion  He exhibits no edema, tenderness or deformity  Lymphadenopathy:     He has no cervical adenopathy  Neurological: He is alert and oriented to person, place, and time  He has normal reflexes  No cranial nerve deficit  He exhibits normal muscle tone  Coordination normal    Skin: Skin is warm and dry  No rash noted  He is not diaphoretic  No erythema  No pallor  Healing wound from recent dermatologic surgery of the scalp   Psychiatric: He has a normal mood and affect   His behavior is normal  Judgment and thought content normal

## 2018-10-24 DIAGNOSIS — I10 ESSENTIAL HYPERTENSION: Primary | ICD-10-CM

## 2018-10-24 RX ORDER — LOSARTAN POTASSIUM 50 MG/1
50 TABLET ORAL DAILY
Qty: 90 TABLET | Refills: 3 | Status: SHIPPED | OUTPATIENT
Start: 2018-10-24 | End: 2019-09-17 | Stop reason: SDUPTHER

## 2019-03-07 LAB
ALBUMIN SERPL-MCNC: 4.5 G/DL (ref 3.6–5.1)
ALBUMIN/GLOB SERPL: 1.8 (CALC) (ref 1–2.5)
ALP SERPL-CCNC: 58 U/L (ref 40–115)
ALT SERPL-CCNC: 29 U/L (ref 9–46)
AST SERPL-CCNC: 21 U/L (ref 10–35)
BILIRUB SERPL-MCNC: 0.6 MG/DL (ref 0.2–1.2)
BUN SERPL-MCNC: 16 MG/DL (ref 7–25)
BUN/CREAT SERPL: ABNORMAL (CALC) (ref 6–22)
CALCIUM SERPL-MCNC: 9.6 MG/DL (ref 8.6–10.3)
CHLORIDE SERPL-SCNC: 103 MMOL/L (ref 98–110)
CHOLEST SERPL-MCNC: 184 MG/DL
CO2 SERPL-SCNC: 28 MMOL/L (ref 20–32)
CREAT SERPL-MCNC: 1.16 MG/DL (ref 0.7–1.33)
EST. AVERAGE GLUCOSE BLD GHB EST-MCNC: 120 (CALC)
EST. AVERAGE GLUCOSE BLD GHB EST-SCNC: 6.6 (CALC)
GLOBULIN SER CALC-MCNC: 2.5 G/DL (CALC) (ref 1.9–3.7)
GLUCOSE SERPL-MCNC: 108 MG/DL (ref 65–99)
HBA1C MFR BLD: 5.8 % OF TOTAL HGB
HDLC SERPL-MCNC: 52 MG/DL
LDLC SERPL DIRECT ASSAY-MCNC: 103 MG/DL
POTASSIUM SERPL-SCNC: 5 MMOL/L (ref 3.5–5.3)
PROT SERPL-MCNC: 7 G/DL (ref 6.1–8.1)
PSA SERPL-MCNC: 0.4 NG/ML
SL AMB EGFR AFRICAN AMERICAN: 80 ML/MIN/1.73M2
SL AMB EGFR NON AFRICAN AMERICAN: 69 ML/MIN/1.73M2
SODIUM SERPL-SCNC: 140 MMOL/L (ref 135–146)
TRIGL SERPL-MCNC: 181 MG/DL

## 2019-03-14 ENCOUNTER — OFFICE VISIT (OUTPATIENT)
Dept: INTERNAL MEDICINE CLINIC | Facility: CLINIC | Age: 59
End: 2019-03-14
Payer: COMMERCIAL

## 2019-03-14 VITALS
BODY MASS INDEX: 26.96 KG/M2 | SYSTOLIC BLOOD PRESSURE: 130 MMHG | WEIGHT: 203.4 LBS | HEIGHT: 73 IN | DIASTOLIC BLOOD PRESSURE: 80 MMHG | HEART RATE: 80 BPM

## 2019-03-14 DIAGNOSIS — E55.9 VITAMIN D DEFICIENCY: ICD-10-CM

## 2019-03-14 DIAGNOSIS — E78.5 HYPERLIPIDEMIA, UNSPECIFIED HYPERLIPIDEMIA TYPE: Chronic | ICD-10-CM

## 2019-03-14 DIAGNOSIS — Z13.29 SCREENING FOR THYROID DISORDER: ICD-10-CM

## 2019-03-14 DIAGNOSIS — K52.9 COLITIS: Chronic | ICD-10-CM

## 2019-03-14 DIAGNOSIS — Z12.11 SCREENING FOR COLON CANCER: ICD-10-CM

## 2019-03-14 DIAGNOSIS — E11.9 TYPE 2 DIABETES MELLITUS WITHOUT COMPLICATION, WITHOUT LONG-TERM CURRENT USE OF INSULIN (HCC): ICD-10-CM

## 2019-03-14 DIAGNOSIS — I10 HYPERTENSION, UNSPECIFIED TYPE: Primary | Chronic | ICD-10-CM

## 2019-03-14 DIAGNOSIS — R73.03 PREDIABETES: Chronic | ICD-10-CM

## 2019-03-14 DIAGNOSIS — Z13.21 ENCOUNTER FOR VITAMIN DEFICIENCY SCREENING: ICD-10-CM

## 2019-03-14 PROCEDURE — 1036F TOBACCO NON-USER: CPT | Performed by: INTERNAL MEDICINE

## 2019-03-14 PROCEDURE — 3008F BODY MASS INDEX DOCD: CPT | Performed by: INTERNAL MEDICINE

## 2019-03-14 PROCEDURE — 99214 OFFICE O/P EST MOD 30 MIN: CPT | Performed by: INTERNAL MEDICINE

## 2019-03-14 PROCEDURE — 3075F SYST BP GE 130 - 139MM HG: CPT | Performed by: INTERNAL MEDICINE

## 2019-03-14 PROCEDURE — 3079F DIAST BP 80-89 MM HG: CPT | Performed by: INTERNAL MEDICINE

## 2019-03-14 RX ORDER — ROSUVASTATIN CALCIUM 5 MG/1
5 TABLET, COATED ORAL DAILY
Qty: 90 TABLET | Refills: 3 | Status: SHIPPED | OUTPATIENT
Start: 2019-03-14 | End: 2020-05-11 | Stop reason: SDUPTHER

## 2019-03-14 NOTE — PROGRESS NOTES
Assessment/Plan:    #Prediabetes  -a1c elevated up to 5 8 from 5 6   -reports positive history in father  -patient has not been dieting and exercising due to holiday season and weather and we encouraged him to exercise  -continue metformin 500mg bid    #HLD  - and HDL 52 currently on Crestor 5 mg 3 times per week along with 1 g of fish oil daily  -informed patient that her goal LDL for him should be less than 70 and he is aware  -patient reports that he will start dieting and exercising  -continue with current medications for now    #HTN  -blood pressure 130/80 and currently on 50 mg of losartan daily  -previously had been on amlodipine which resulted in edema also has a history of AFib however is deferring use of any beta-blocker    #Nonmelanoma Skin Cancer  -currently continues to see Dermatology annually and had removal of suspicious lesions and skin tags on his head which were benign    #Eye Issues  -previously seen by [facility] 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 and was on suppressive doxycycline however this has resolved many years ago    #Possible Crohn's  -underwent endoscopy and colonoscopy revealing possible Crohn's on biopsy however patient denies any flare ups, bleeding in the stool, abdominal cramps for pain, issues with diet, diarrhea  -continues to take mesalamine 800 mg daily and plans to see GI in June for repeat colonoscopy screening    #Paroxysmal Afib  -cardioverted in the past    #Health Maintenance  -routine labs and followup in 6 months  -flu vaccine up to date 2018  -colonoscopy 2017 and due for repeat in June 2019    Addendum 06/24/2019 patient completed colonoscopy revealing congestion, erythema, granularity in the splenic flexure compatible with colitis which is possibly Crohn's disease  Mild diverticulosis of the sigmoid colon was noted  Patient will be started on mesalamine 800 mg b i d     Will need repeat colonoscopy in 1 year      Addendum 9/13/19 patient completed routine labs revealing , HDL 46, vitamin D 40, TSH 1 88, a1c 5 9, patient has a followup appointment 9/17/19 and we will review the results at that time with the patient  No problem-specific Assessment & Plan notes found for this encounter  Diagnoses and all orders for this visit:    Hypertension, unspecified type  -     CBC and differential; Future  -     Lipid Panel with Direct LDL reflex; Future  -     Vitamin D 25 hydroxy; Future  -     Hemoglobin A1C; Future  -     TSH, 3rd generation with Free T4 reflex; Future  -     Comprehensive metabolic panel; Future    Hyperlipidemia, unspecified hyperlipidemia type  -     CBC and differential; Future  -     Lipid Panel with Direct LDL reflex; Future  -     Vitamin D 25 hydroxy; Future  -     Hemoglobin A1C; Future  -     TSH, 3rd generation with Free T4 reflex; Future  -     Comprehensive metabolic panel; Future  -     rosuvastatin (CRESTOR) 5 mg tablet; Take 1 tablet (5 mg total) by mouth daily Take 1 tab three times weekly    Prediabetes  -     CBC and differential; Future  -     Lipid Panel with Direct LDL reflex; Future  -     Vitamin D 25 hydroxy; Future  -     Hemoglobin A1C; Future  -     TSH, 3rd generation with Free T4 reflex; Future  -     Comprehensive metabolic panel; Future    Colitis  -     CBC and differential; Future  -     Lipid Panel with Direct LDL reflex; Future  -     Vitamin D 25 hydroxy; Future  -     Hemoglobin A1C; Future  -     TSH, 3rd generation with Free T4 reflex; Future  -     Comprehensive metabolic panel; Future  -     Ambulatory referral to Colorectal Surgery; Future    Screening for colon cancer  -     CBC and differential; Future  -     Lipid Panel with Direct LDL reflex; Future  -     Vitamin D 25 hydroxy; Future  -     Hemoglobin A1C; Future  -     TSH, 3rd generation with Free T4 reflex; Future  -     Comprehensive metabolic panel; Future  -     Ambulatory referral to Colorectal Surgery;  Future    Screening for thyroid disorder  -     CBC and differential; Future  -     Lipid Panel with Direct LDL reflex; Future  -     Vitamin D 25 hydroxy; Future  -     Hemoglobin A1C; Future  -     TSH, 3rd generation with Free T4 reflex; Future  -     Comprehensive metabolic panel; Future    Vitamin D deficiency  -     CBC and differential; Future  -     Lipid Panel with Direct LDL reflex; Future  -     Vitamin D 25 hydroxy; Future  -     Hemoglobin A1C; Future  -     TSH, 3rd generation with Free T4 reflex; Future  -     Comprehensive metabolic panel; Future    Encounter for vitamin deficiency screening  -     CBC and differential; Future  -     Lipid Panel with Direct LDL reflex; Future  -     Vitamin D 25 hydroxy; Future  -     Hemoglobin A1C; Future  -     TSH, 3rd generation with Free T4 reflex; Future  -     Comprehensive metabolic panel; Future    Type 2 diabetes mellitus without complication, without long-term current use of insulin (HCC)  -     rosuvastatin (CRESTOR) 5 mg tablet; Take 1 tablet (5 mg total) by mouth daily Take 1 tab three times weekly  -     metFORMIN (GLUCOPHAGE) 500 mg tablet;  Take 1 tablet (500 mg total) by mouth 2 (two) times a day for 90 days            Current Outpatient Medications:     aspirin (ASPIRIN LOW DOSE) 81 MG tablet, Take by mouth Take one tab twice weekly , Disp: , Rfl:     cyanocobalamin (VITAMIN B-12) 1,000 mcg tablet, Take by mouth daily, Disp: , Rfl:     DELZICOL 400 MG, Take 2 capsules (800 mg total) by mouth 3 (three) times a day, Disp: 360 capsule, Rfl: 4    fluticasone (FLONASE) 50 mcg/act nasal spray, into each nostril, Disp: , Rfl:     losartan (COZAAR) 50 mg tablet, Take 1 tablet (50 mg total) by mouth daily, Disp: 90 tablet, Rfl: 3    mesalamine (ASACOL) 800 MG EC tablet, Take 1 tablet (800 mg total) by mouth 3 (three) times a day, Disp: 90 tablet, Rfl: 12    metFORMIN (GLUCOPHAGE) 500 mg tablet, Take 1 tablet (500 mg total) by mouth 2 (two) times a day for 90 days, Disp: 180 tablet, Rfl: 3    multivitamin (THERAGRAN) TABS, Take 1 tablet by mouth daily, Disp: , Rfl:     RESTASIS 0 05 % ophthalmic emulsion, INSTILL 1 DROP INTO THE AFFECTED EYE(S) TWICE A DAY, Disp: , Rfl: 3    rosuvastatin (CRESTOR) 5 mg tablet, Take 1 tablet (5 mg total) by mouth daily Take 1 tab three times weekly, Disp: 90 tablet, Rfl: 3    Subjective:      Patient ID: Ivan Platt is a 62 y o  male  HPI     Patient presents for routine visit  Denies any recent hospitalizations or surgeries  States that he is not watching his diet and exercising as much as he used to due to the winter weather months and holiday season  His A1c is elevated at 5 8 and he is currently on metformin 500 mg b i d  Reports a family history of diabetes and is aware that he needs to cut back on carbohydrates and sweets as well as exercise  He will have a repeat A1c recheck in 6 months with labs  Patient's LDL is 103 and HDL 52 currently on Crestor 5 mg 3 times per week along with 1 g Omega 3 fish oil  His blood pressure today was noted to be 130/80 and currently on losartan 50 mg daily  Patient reports that he is due for colonoscopy  Evaluation of his previous record reveals that he has possible inflammatory bowel disease with biopsy revealing possible Crohn's disease  He is currently on mesalamine 800 mg for the past 2 years and has been doing well without any flare ups or diarrhea or any abdominal cramping or pain  He is due for colonoscopy at this time and we will refer him to GI  Patient reports that he is up-to-date on his flu vaccine  He will return in 6 months with labs      The following portions of the patient's history were reviewed and updated as appropriate: allergies, current medications, past family history, past medical history, past social history, past surgical history and problem list     Review of Systems   Constitutional: Negative for activity change, appetite change, chills, diaphoresis, fatigue, fever and unexpected weight change  HENT: Negative for congestion, ear pain, hearing loss, postnasal drip, rhinorrhea, sinus pressure, sinus pain, sore throat and tinnitus  Eyes: Negative for photophobia, pain and visual disturbance  Respiratory: Negative for cough, shortness of breath and wheezing  Cardiovascular: Negative for chest pain, palpitations and leg swelling  Gastrointestinal: Negative for abdominal distention, abdominal pain, blood in stool, constipation, diarrhea, nausea and vomiting  Genitourinary: Negative for difficulty urinating, frequency and hematuria  Musculoskeletal: Negative for arthralgias, back pain, gait problem, joint swelling, myalgias, neck pain and neck stiffness  Skin: Negative for color change, pallor, rash and wound  Neurological: Negative for dizziness, tremors, seizures, light-headedness, numbness and headaches  Hematological: Does not bruise/bleed easily  Objective:      /80 (BP Location: Left arm, Patient Position: Sitting, Cuff Size: Standard)   Pulse 80   Ht 6' 1" (1 854 m)   Wt 92 3 kg (203 lb 6 4 oz)   BMI 26 84 kg/m²          Physical Exam   Constitutional: He is oriented to person, place, and time  He appears well-developed and well-nourished  HENT:   Head: Normocephalic and atraumatic  Right Ear: External ear normal    Left Ear: External ear normal    Nose: Nose normal    Mouth/Throat: Oropharynx is clear and moist  No oropharyngeal exudate  Eyes: Pupils are equal, round, and reactive to light  Conjunctivae and EOM are normal  Right eye exhibits no discharge  Left eye exhibits no discharge  Neck: Normal range of motion  Neck supple  No JVD present  No thyromegaly present  Cardiovascular: Normal rate, regular rhythm, normal heart sounds and intact distal pulses  No murmur heard  Pulmonary/Chest: Effort normal and breath sounds normal  No respiratory distress  He has no wheezes  Abdominal: Soft   Bowel sounds are normal  He exhibits no distension and no mass  There is no tenderness  There is no rebound and no guarding  Musculoskeletal: Normal range of motion  He exhibits no edema, tenderness or deformity  Lymphadenopathy:     He has no cervical adenopathy  Neurological: He is alert and oriented to person, place, and time  He has normal reflexes  Skin: Skin is warm and dry  No rash noted  No erythema  Vitals reviewed

## 2019-09-11 LAB
25(OH)D3 SERPL-MCNC: 40 NG/ML (ref 30–100)
ALBUMIN SERPL-MCNC: 4.3 G/DL (ref 3.6–5.1)
ALBUMIN/GLOB SERPL: 1.8 (CALC) (ref 1–2.5)
ALP SERPL-CCNC: 51 U/L (ref 40–115)
ALT SERPL-CCNC: 26 U/L (ref 9–46)
AST SERPL-CCNC: 19 U/L (ref 10–35)
BASOPHILS # BLD AUTO: 61 CELLS/UL (ref 0–200)
BASOPHILS NFR BLD AUTO: 1 %
BILIRUB SERPL-MCNC: 0.8 MG/DL (ref 0.2–1.2)
BUN SERPL-MCNC: 21 MG/DL (ref 7–25)
BUN/CREAT SERPL: ABNORMAL (CALC) (ref 6–22)
CALCIUM SERPL-MCNC: 9.5 MG/DL (ref 8.6–10.3)
CHLORIDE SERPL-SCNC: 103 MMOL/L (ref 98–110)
CHOLEST SERPL-MCNC: 172 MG/DL
CHOLEST/HDLC SERPL: 3.7 (CALC)
CO2 SERPL-SCNC: 30 MMOL/L (ref 20–32)
CREAT SERPL-MCNC: 1.33 MG/DL (ref 0.7–1.33)
EOSINOPHIL # BLD AUTO: 329 CELLS/UL (ref 15–500)
EOSINOPHIL NFR BLD AUTO: 5.4 %
ERYTHROCYTE [DISTWIDTH] IN BLOOD BY AUTOMATED COUNT: 13.7 % (ref 11–15)
GLOBULIN SER CALC-MCNC: 2.4 G/DL (CALC) (ref 1.9–3.7)
GLUCOSE SERPL-MCNC: 97 MG/DL (ref 65–99)
HBA1C MFR BLD: 5.9 % OF TOTAL HGB
HCT VFR BLD AUTO: 45.2 % (ref 38.5–50)
HDLC SERPL-MCNC: 46 MG/DL
HGB BLD-MCNC: 14.7 G/DL (ref 13.2–17.1)
LDLC SERPL CALC-MCNC: 99 MG/DL (CALC)
LYMPHOCYTES # BLD AUTO: 1373 CELLS/UL (ref 850–3900)
LYMPHOCYTES NFR BLD AUTO: 22.5 %
MCH RBC QN AUTO: 29.4 PG (ref 27–33)
MCHC RBC AUTO-ENTMCNC: 32.5 G/DL (ref 32–36)
MCV RBC AUTO: 90.4 FL (ref 80–100)
MONOCYTES # BLD AUTO: 409 CELLS/UL (ref 200–950)
MONOCYTES NFR BLD AUTO: 6.7 %
NEUTROPHILS # BLD AUTO: 3928 CELLS/UL (ref 1500–7800)
NEUTROPHILS NFR BLD AUTO: 64.4 %
NONHDLC SERPL-MCNC: 126 MG/DL (CALC)
PLATELET # BLD AUTO: 233 THOUSAND/UL (ref 140–400)
PMV BLD REES-ECKER: 10.6 FL (ref 7.5–12.5)
POTASSIUM SERPL-SCNC: 4.7 MMOL/L (ref 3.5–5.3)
PROT SERPL-MCNC: 6.7 G/DL (ref 6.1–8.1)
RBC # BLD AUTO: 5 MILLION/UL (ref 4.2–5.8)
SL AMB EGFR AFRICAN AMERICAN: 67 ML/MIN/1.73M2
SL AMB EGFR NON AFRICAN AMERICAN: 58 ML/MIN/1.73M2
SODIUM SERPL-SCNC: 140 MMOL/L (ref 135–146)
TRIGL SERPL-MCNC: 162 MG/DL
TSH SERPL-ACNC: 1.88 MIU/L (ref 0.4–4.5)
WBC # BLD AUTO: 6.1 THOUSAND/UL (ref 3.8–10.8)

## 2019-09-17 ENCOUNTER — OFFICE VISIT (OUTPATIENT)
Dept: INTERNAL MEDICINE CLINIC | Facility: CLINIC | Age: 59
End: 2019-09-17
Payer: COMMERCIAL

## 2019-09-17 VITALS
DIASTOLIC BLOOD PRESSURE: 84 MMHG | SYSTOLIC BLOOD PRESSURE: 124 MMHG | HEART RATE: 74 BPM | HEIGHT: 73 IN | BODY MASS INDEX: 26.93 KG/M2 | OXYGEN SATURATION: 97 % | RESPIRATION RATE: 14 BRPM | WEIGHT: 203.2 LBS

## 2019-09-17 DIAGNOSIS — R73.03 PREDIABETES: Chronic | ICD-10-CM

## 2019-09-17 DIAGNOSIS — E78.5 HYPERLIPIDEMIA, UNSPECIFIED HYPERLIPIDEMIA TYPE: Primary | Chronic | ICD-10-CM

## 2019-09-17 DIAGNOSIS — Z23 NEED FOR INFLUENZA VACCINATION: ICD-10-CM

## 2019-09-17 DIAGNOSIS — K51.919 ULCERATIVE COLITIS WITH COMPLICATION, UNSPECIFIED LOCATION (HCC): ICD-10-CM

## 2019-09-17 DIAGNOSIS — I10 ESSENTIAL HYPERTENSION: ICD-10-CM

## 2019-09-17 DIAGNOSIS — Z12.5 SCREENING FOR PROSTATE CANCER: ICD-10-CM

## 2019-09-17 DIAGNOSIS — R00.2 PALPITATIONS: Chronic | ICD-10-CM

## 2019-09-17 DIAGNOSIS — Z23 INFLUENZA VACCINE NEEDED: ICD-10-CM

## 2019-09-17 PROCEDURE — 3008F BODY MASS INDEX DOCD: CPT | Performed by: INTERNAL MEDICINE

## 2019-09-17 PROCEDURE — 90682 RIV4 VACC RECOMBINANT DNA IM: CPT

## 2019-09-17 PROCEDURE — 99214 OFFICE O/P EST MOD 30 MIN: CPT | Performed by: INTERNAL MEDICINE

## 2019-09-17 PROCEDURE — 90471 IMMUNIZATION ADMIN: CPT

## 2019-09-17 PROCEDURE — 3079F DIAST BP 80-89 MM HG: CPT | Performed by: INTERNAL MEDICINE

## 2019-09-17 PROCEDURE — 3074F SYST BP LT 130 MM HG: CPT | Performed by: INTERNAL MEDICINE

## 2019-09-17 RX ORDER — MENTHOL 5.8 MG/1
LOZENGE ORAL
COMMUNITY

## 2019-09-17 RX ORDER — ZINC OXIDE 13 %
CREAM (GRAM) TOPICAL
COMMUNITY
End: 2021-12-15 | Stop reason: ALTCHOICE

## 2019-09-17 RX ORDER — MESALAMINE 800 MG/1
800 TABLET, DELAYED RELEASE ORAL DAILY
Qty: 90 TABLET | Refills: 3 | Status: SHIPPED | OUTPATIENT
Start: 2019-09-17 | End: 2021-04-27 | Stop reason: SDUPTHER

## 2019-09-17 RX ORDER — LOSARTAN POTASSIUM 50 MG/1
50 TABLET ORAL DAILY
Qty: 90 TABLET | Refills: 3 | Status: SHIPPED | OUTPATIENT
Start: 2019-09-17 | End: 2020-02-19

## 2019-09-17 RX ORDER — UBIDECARENONE 75 MG
CAPSULE ORAL
COMMUNITY
End: 2020-02-12 | Stop reason: SDUPTHER

## 2019-09-17 NOTE — PROGRESS NOTES
Assessment/Plan:    #Prediabetes  -a1c now up to 5 9  -father is diabetic  -currently on metformin 500mg bid  -defers diabetic education class  -informed patient to cut out carbs    #HLD  -LDL 99 and HDL 46 currently on Crestor 5 mg 3 times per week along with 1 g of fish oil daily  -goal LDL less than 70  -patient states he will start changing up his diet some more    #HTN  -blood pressure 124/84 and currently on 50 mg of losartan daily  -previously amlodipine cause edema    #Nonmelanoma Skin Cancer  -sees dermatology yearly and will revisit in March 2020    #Eye Issues  -previously seen by [facility] 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 and was on suppressive doxycycline however this has resolved many years ago    #Possible Crohn's  -continues to remain asymptomatic but sees GI for colonoscopy yearly  -June 2019 scope reveals crohns  -patient on mesalamine daily because bid dosing cause cramping and bloating    #Paroxysmal Afib  -chemically cardioverted in the past and no longer an issue    #Health Maintenance  -routine labs 6 months and followup in 12 months  -flu vaccine up to date 2019  -colonoscopy 2019 and due for repeat in June 2020    No problem-specific Assessment & Plan notes found for this encounter  Diagnoses and all orders for this visit:    Hyperlipidemia, unspecified hyperlipidemia type  -     Comprehensive metabolic panel; Future  -     Hemoglobin A1C; Future  -     Lipid Panel with Direct LDL reflex; Future  -     PSA, total and free; Future    Essential hypertension  -     losartan (COZAAR) 50 mg tablet; Take 1 tablet (50 mg total) by mouth daily  -     Comprehensive metabolic panel; Future  -     Hemoglobin A1C; Future  -     Lipid Panel with Direct LDL reflex; Future  -     PSA, total and free; Future    Ulcerative colitis with complication, unspecified location (HCC)  -     mesalamine (ASACOL) 800 MG EC tablet;  Take 1 tablet (800 mg total) by mouth daily  -     Comprehensive metabolic panel; Future  -     Hemoglobin A1C; Future  -     Lipid Panel with Direct LDL reflex; Future  -     PSA, total and free; Future    Palpitations  -     Comprehensive metabolic panel; Future  -     Hemoglobin A1C; Future  -     Lipid Panel with Direct LDL reflex; Future  -     PSA, total and free; Future    Influenza vaccine needed  -     Comprehensive metabolic panel; Future  -     Hemoglobin A1C; Future  -     Lipid Panel with Direct LDL reflex; Future  -     PSA, total and free; Future  -     influenza vaccine, [id number], quadrivalent, recombinant, PF, 0 5 mL, for patients 18 yr+ (FLUBLOK)    Screening for prostate cancer  -     Comprehensive metabolic panel; Future  -     Hemoglobin A1C; Future  -     Lipid Panel with Direct LDL reflex; Future  -     PSA, total and free; Future    Prediabetes  -     Comprehensive metabolic panel; Future  -     Hemoglobin A1C; Future  -     Lipid Panel with Direct LDL reflex; Future  -     PSA, total and free; Future    Other orders  -     Multiple Vitamins-Iron (QC DAILY MULTIVITAMINS/IRON) TABS; Take by mouth  -     cyanocobalamin (VITAMIN B-12) 100 mcg tablet; Take by mouth  -     Probiotic Product (PROBIOTIC DAILY) CAPS;  Take by mouth            Current Outpatient Medications:     aspirin (ASPIRIN LOW DOSE) 81 MG tablet, Take by mouth Take one tab twice weekly , Disp: , Rfl:     cyanocobalamin (VITAMIN B-12) 1,000 mcg tablet, Take by mouth daily, Disp: , Rfl:     cyanocobalamin (VITAMIN B-12) 100 mcg tablet, Take by mouth, Disp: , Rfl:     DELZICOL 400 MG, Take 2 capsules (800 mg total) by mouth 3 (three) times a day, Disp: 360 capsule, Rfl: 4    fluticasone (FLONASE) 50 mcg/act nasal spray, into each nostril, Disp: , Rfl:     losartan (COZAAR) 50 mg tablet, Take 1 tablet (50 mg total) by mouth daily, Disp: 90 tablet, Rfl: 3    mesalamine (ASACOL) 800 MG EC tablet, Take 1 tablet (800 mg total) by mouth daily, Disp: 90 tablet, Rfl: 3    metFORMIN (GLUCOPHAGE) 500 mg tablet, Take 1 tablet (500 mg total) by mouth 2 (two) times a day for 90 days, Disp: 180 tablet, Rfl: 3    Multiple Vitamins-Iron (QC DAILY MULTIVITAMINS/IRON) TABS, Take by mouth, Disp: , Rfl:     multivitamin (THERAGRAN) TABS, Take 1 tablet by mouth daily, Disp: , Rfl:     Probiotic Product (PROBIOTIC DAILY) CAPS, Take by mouth, Disp: , Rfl:     RESTASIS 0 05 % ophthalmic emulsion, INSTILL 1 DROP INTO THE AFFECTED EYE(S) TWICE A DAY, Disp: , Rfl: 3    rosuvastatin (CRESTOR) 5 mg tablet, Take 1 tablet (5 mg total) by mouth daily Take 1 tab three times weekly, Disp: 90 tablet, Rfl: 3    Subjective:      Patient ID: Jaquan Hunt is a 61 y o  male  HPI     Patient presents for a routine checkup  Denies any recent hospitalizations  Underwent colonoscopy in June 2019 due to crohns surveillance  Patient remains on mesalamine however only taking it daily due to cramps and bloating with BID dosing  Denies any symptoms and states he is not limiting his diet at this time  A1c up to 5 9 and we encouraged him to cut out carbs  He defers diabetic education class at this time  LDL 99 and HDL 46 currently on crestor and fish oil  We will continue with this regimen, he is aware that goal LDL is <70  BP stable today on losartan and we refilled it  Patient received his flu vaccine today and will return to care in 1 year with labs in 6 months  The following portions of the patient's history were reviewed and updated as appropriate: allergies, current medications, past family history, past medical history, past social history, past surgical history and problem list     Review of Systems   Constitutional: Negative for activity change, appetite change, fatigue and fever  HENT: Negative for congestion, ear pain, hearing loss, sore throat and tinnitus  Eyes: Negative for photophobia, pain and visual disturbance  Respiratory: Negative for cough, shortness of breath and wheezing      Cardiovascular: Negative for chest pain and leg swelling  Gastrointestinal: Negative for abdominal distention, abdominal pain, constipation, diarrhea, nausea and vomiting  Genitourinary: Negative for difficulty urinating, frequency and hematuria  Musculoskeletal: Negative for arthralgias, back pain, gait problem, joint swelling, myalgias, neck pain and neck stiffness  Skin: Negative for color change, pallor, rash and wound  Neurological: Negative for dizziness, tremors, syncope, weakness, numbness and headaches  Hematological: Negative for adenopathy  Does not bruise/bleed easily  Objective:      /84   Pulse 74   Resp 14   Ht 6' 1" (1 854 m)   Wt 92 2 kg (203 lb 3 2 oz)   SpO2 97%   BMI 26 81 kg/m²          Physical Exam   Constitutional: He is oriented to person, place, and time  He appears well-developed and well-nourished  HENT:   Head: Normocephalic and atraumatic  Right Ear: External ear normal    Left Ear: External ear normal    Nose: Nose normal    Mouth/Throat: Oropharynx is clear and moist    Eyes: Pupils are equal, round, and reactive to light  Conjunctivae and EOM are normal    Neck: Normal range of motion  Neck supple  No JVD present  No thyromegaly present  Cardiovascular: Normal rate, regular rhythm, normal heart sounds and intact distal pulses  No murmur heard  Pulmonary/Chest: Effort normal and breath sounds normal  No respiratory distress  He has no wheezes  He exhibits no tenderness  Abdominal: Soft  Bowel sounds are normal  He exhibits no distension and no mass  There is no tenderness  There is no rebound and no guarding  Musculoskeletal: Normal range of motion  He exhibits no edema, tenderness or deformity  Lymphadenopathy:     He has no cervical adenopathy  Neurological: He is alert and oriented to person, place, and time  He has normal reflexes  Skin: Skin is warm and dry  No rash noted  No erythema  Vitals reviewed

## 2020-02-12 ENCOUNTER — OFFICE VISIT (OUTPATIENT)
Dept: FAMILY MEDICINE CLINIC | Facility: CLINIC | Age: 60
End: 2020-02-12
Payer: COMMERCIAL

## 2020-02-12 VITALS
RESPIRATION RATE: 16 BRPM | DIASTOLIC BLOOD PRESSURE: 78 MMHG | OXYGEN SATURATION: 99 % | SYSTOLIC BLOOD PRESSURE: 128 MMHG | WEIGHT: 197 LBS | HEART RATE: 83 BPM | BODY MASS INDEX: 26.11 KG/M2 | HEIGHT: 73 IN | TEMPERATURE: 96.6 F

## 2020-02-12 DIAGNOSIS — R53.83 OTHER FATIGUE: ICD-10-CM

## 2020-02-12 DIAGNOSIS — Z12.5 SCREENING FOR PROSTATE CANCER: ICD-10-CM

## 2020-02-12 DIAGNOSIS — E55.9 VITAMIN D DEFICIENCY: ICD-10-CM

## 2020-02-12 DIAGNOSIS — E53.8 VITAMIN B12 DEFICIENCY: ICD-10-CM

## 2020-02-12 DIAGNOSIS — J20.9 ACUTE BRONCHITIS, UNSPECIFIED ORGANISM: ICD-10-CM

## 2020-02-12 DIAGNOSIS — K52.9 COLITIS: Chronic | ICD-10-CM

## 2020-02-12 DIAGNOSIS — R73.03 PREDIABETES: Chronic | ICD-10-CM

## 2020-02-12 DIAGNOSIS — E78.00 PURE HYPERCHOLESTEROLEMIA: Chronic | ICD-10-CM

## 2020-02-12 DIAGNOSIS — I10 ESSENTIAL HYPERTENSION: Primary | Chronic | ICD-10-CM

## 2020-02-12 PROCEDURE — 1036F TOBACCO NON-USER: CPT | Performed by: FAMILY MEDICINE

## 2020-02-12 PROCEDURE — 99204 OFFICE O/P NEW MOD 45 MIN: CPT | Performed by: FAMILY MEDICINE

## 2020-02-12 PROCEDURE — 3078F DIAST BP <80 MM HG: CPT | Performed by: FAMILY MEDICINE

## 2020-02-12 PROCEDURE — 3008F BODY MASS INDEX DOCD: CPT | Performed by: FAMILY MEDICINE

## 2020-02-12 PROCEDURE — 3074F SYST BP LT 130 MM HG: CPT | Performed by: FAMILY MEDICINE

## 2020-02-12 RX ORDER — AZITHROMYCIN 250 MG/1
TABLET, FILM COATED ORAL
Qty: 6 TABLET | Refills: 0 | Status: SHIPPED | OUTPATIENT
Start: 2020-02-12 | End: 2020-02-17

## 2020-02-12 NOTE — PROGRESS NOTES
Assessment/Plan:    No problem-specific Assessment & Plan notes found for this encounter  Diagnoses and all orders for this visit:    Essential hypertension  Comments:  Controlled on present management  Pt to continue a lower carb/salt diet, and getting regular exercise  FBW ordered  Pure hypercholesterolemia  Comments:  Stable on present management with Rosuvastatin  Orders:  -     Comprehensive metabolic panel; Future  -     Lipid Panel with Direct LDL reflex; Future    Prediabetes  Comments:  Hx of; A1c incl with FBW ordered  Orders:  -     HEMOGLOBIN A1C W/ EAG ESTIMATION; Future    Screening for prostate cancer  Comments:  PSA incl with FBW  Orders:  -     PSA, Total Screen; Future    Vitamin D deficiency  Comments:  Hx of; Vit D level incl with FBW  Orders:  -     Vitamin D 25 hydroxy; Future    Vitamin B12 deficiency  Comments:  Hx of; B12 level incl with FBW  Orders:  -     Vitamin B12; Future    Other fatigue  -     TSH, 3rd generation with Free T4 reflex; Future  -     CBC and differential; Future    Acute bronchitis, unspecified organism  Comments:  Pt stable on exam   Treating with Azithromycin, warm salt water gargles, OTC Cough/Cold Preps PRN, rest, and good PO hydration  Chest xray ordered  Orders:  -     azithromycin (ZITHROMAX) 250 mg tablet; Take 2 tablets by mouth on day #1, then 1 tab daily for four more days  -     XR chest pa & lateral; Future    Colitis  Comments:  HX of UC; stable,  Sees [facility] in f/u soon  Other orders  -     Calcium Polycarbophil (FIBER-CAPS PO); Take by mouth  -     Fish Oil-Cholecalciferol (FISH OIL + D3 PO); Take by mouth          Subjective:      Patient ID: Ottoniel Magaña is a 61 y o  male  Pt is new to the clinic today  He is followed regularly by [facility] for hx of Ulcerative Colitis  No regular exercise  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  Pt is generally a healthy eater          The following portions of the patient's history were reviewed and updated as appropriate: allergies, current medications, past family history, past social history, past surgical history and problem list     Past Medical History:   Diagnosis Date    Allergic     Joint pain     Laceration of lower leg     last assessed 06/06/13     History reviewed  No pertinent surgical history  Current Outpatient Medications:     aspirin (ASPIRIN LOW DOSE) 81 MG tablet, Take by mouth Take one tab twice weekly , Disp: , Rfl:     Calcium Polycarbophil (FIBER-CAPS PO), Take by mouth, Disp: , Rfl:     cyanocobalamin (VITAMIN B-12) 1,000 mcg tablet, Take by mouth daily, Disp: , Rfl:     Fish Oil-Cholecalciferol (FISH OIL + D3 PO), Take by mouth, Disp: , Rfl:     fluticasone (FLONASE) 50 mcg/act nasal spray, into each nostril, Disp: , Rfl:     losartan (COZAAR) 50 mg tablet, Take 1 tablet (50 mg total) by mouth daily, Disp: 90 tablet, Rfl: 3    Multiple Vitamins-Iron (QC DAILY MULTIVITAMINS/IRON) TABS, Take by mouth, Disp: , Rfl:     Probiotic Product (PROBIOTIC DAILY) CAPS, Take by mouth, Disp: , Rfl:     RESTASIS 0 05 % ophthalmic emulsion, once , Disp: , Rfl: 3    rosuvastatin (CRESTOR) 5 mg tablet, Take 1 tablet (5 mg total) by mouth daily Take 1 tab three times weekly, Disp: 90 tablet, Rfl: 3    azithromycin (ZITHROMAX) 250 mg tablet, Take 2 tablets by mouth on day #1, then 1 tab daily for four more days  , Disp: 6 tablet, Rfl: 0    mesalamine (ASACOL) 800 MG EC tablet, Take 1 tablet (800 mg total) by mouth daily, Disp: 90 tablet, Rfl: 3    metFORMIN (GLUCOPHAGE) 500 mg tablet, Take 1 tablet (500 mg total) by mouth 2 (two) times a day for 90 days, Disp: 180 tablet, Rfl: 3    Allergies   Allergen Reactions    Penicillins      Family History   Problem Relation Age of Onset    No Known Problems Mother      Social History     Tobacco Use    Smoking status: Never Smoker    Smokeless tobacco: Never Used   Substance Use Topics    Alcohol use: Yes     Comment: occasional    Drug use: Not on file         Review of Systems   Constitutional: Negative for activity change  HENT: Positive for congestion  Respiratory: Negative for shortness of breath  Some residual cough  Cardiovascular: Negative for chest pain  Objective:      /78   Pulse 83   Temp (!) 96 6 °F (35 9 °C)   Resp 16   Ht 6' 1 39" (1 864 m)   Wt 89 4 kg (197 lb)   SpO2 99%   BMI 25 72 kg/m²          Physical Exam   Constitutional: He is oriented to person, place, and time  He appears well-developed and well-nourished  No distress  HENT:   Head: Normocephalic and atraumatic  Right Ear: Hearing, tympanic membrane, external ear and ear canal normal    Left Ear: Hearing, tympanic membrane, external ear and ear canal normal    Mouth/Throat: Uvula is midline and oropharynx is clear and moist  No oropharyngeal exudate, posterior oropharyngeal edema or posterior oropharyngeal erythema  Eyes: Conjunctivae are normal    Neck: Normal range of motion  Neck supple  No JVD present  No thyromegaly present  Cardiovascular: Normal rate, regular rhythm and normal heart sounds  Exam reveals no gallop and no friction rub  No murmur heard  Pulmonary/Chest: Effort normal  No stridor  No respiratory distress  He has wheezes in the left upper field and the left lower field  He has no rales  Faint wheezes heard in the posterior lung fields - L > R  No rales  Lymphadenopathy:     He has no cervical adenopathy  Neurological: He is alert and oriented to person, place, and time  Skin: He is not diaphoretic  Psychiatric: He has a normal mood and affect  His behavior is normal  Judgment and thought content normal    Nursing note and vitals reviewed

## 2020-02-13 ENCOUNTER — APPOINTMENT (OUTPATIENT)
Dept: RADIOLOGY | Age: 60
End: 2020-02-13
Payer: COMMERCIAL

## 2020-02-13 DIAGNOSIS — J20.9 ACUTE BRONCHITIS, UNSPECIFIED ORGANISM: ICD-10-CM

## 2020-02-13 PROCEDURE — 71046 X-RAY EXAM CHEST 2 VIEWS: CPT

## 2020-02-14 NOTE — RESULT ENCOUNTER NOTE
Please let the patient know that their Chest Xray was normal - I DO want him to finish the antibiotic though! Thanks     Dr Rodriguez Knows

## 2020-02-19 ENCOUNTER — TELEPHONE (OUTPATIENT)
Dept: INTERNAL MEDICINE CLINIC | Facility: CLINIC | Age: 60
End: 2020-02-19

## 2020-02-19 DIAGNOSIS — I10 ESSENTIAL HYPERTENSION: Primary | ICD-10-CM

## 2020-02-19 RX ORDER — LOSARTAN POTASSIUM 25 MG/1
50 TABLET ORAL DAILY
Qty: 180 TABLET | Refills: 1 | Status: SHIPPED | OUTPATIENT
Start: 2020-02-19 | End: 2020-08-04 | Stop reason: SDUPTHER

## 2020-02-19 NOTE — TELEPHONE ENCOUNTER
Pt's Losartan 50 mg is on backorder  Pharmacy asked what you would like to substitute with  Please advise

## 2020-02-19 NOTE — TELEPHONE ENCOUNTER
Pt prefers not to use another pharmacy  Pt's wife called [facility] and they told her they have a million 25 mg and asked if you could just send a script for two daily  Please advise

## 2020-05-11 DIAGNOSIS — E11.9 TYPE 2 DIABETES MELLITUS WITHOUT COMPLICATION, WITHOUT LONG-TERM CURRENT USE OF INSULIN (HCC): ICD-10-CM

## 2020-05-11 DIAGNOSIS — E78.5 HYPERLIPIDEMIA, UNSPECIFIED HYPERLIPIDEMIA TYPE: Chronic | ICD-10-CM

## 2020-05-11 RX ORDER — ROSUVASTATIN CALCIUM 5 MG/1
5 TABLET, COATED ORAL DAILY
Qty: 90 TABLET | Refills: 3 | Status: SHIPPED | OUTPATIENT
Start: 2020-05-11 | End: 2021-07-18

## 2020-05-21 LAB
25(OH)D3 SERPL-MCNC: 41 NG/ML (ref 30–100)
ALBUMIN SERPL-MCNC: 4.4 G/DL (ref 3.6–5.1)
ALBUMIN/GLOB SERPL: 1.8 (CALC) (ref 1–2.5)
ALP SERPL-CCNC: 59 U/L (ref 35–144)
ALT SERPL-CCNC: 23 U/L (ref 9–46)
AST SERPL-CCNC: 21 U/L (ref 10–35)
BASOPHILS # BLD AUTO: 68 CELLS/UL (ref 0–200)
BASOPHILS NFR BLD AUTO: 1.2 %
BILIRUB SERPL-MCNC: 0.8 MG/DL (ref 0.2–1.2)
BUN SERPL-MCNC: 23 MG/DL (ref 7–25)
BUN/CREAT SERPL: NORMAL (CALC) (ref 6–22)
CALCIUM SERPL-MCNC: 9.4 MG/DL (ref 8.6–10.3)
CHLORIDE SERPL-SCNC: 104 MMOL/L (ref 98–110)
CHOLEST SERPL-MCNC: 154 MG/DL
CHOLEST/HDLC SERPL: 2.8 (CALC)
CO2 SERPL-SCNC: 30 MMOL/L (ref 20–32)
CREAT SERPL-MCNC: 1.13 MG/DL (ref 0.7–1.33)
EOSINOPHIL # BLD AUTO: 302 CELLS/UL (ref 15–500)
EOSINOPHIL NFR BLD AUTO: 5.3 %
ERYTHROCYTE [DISTWIDTH] IN BLOOD BY AUTOMATED COUNT: 13.1 % (ref 11–15)
EST. AVERAGE GLUCOSE BLD GHB EST-MCNC: 117 (CALC)
EST. AVERAGE GLUCOSE BLD GHB EST-SCNC: 6.5 (CALC)
GLOBULIN SER CALC-MCNC: 2.4 G/DL (CALC) (ref 1.9–3.7)
GLUCOSE SERPL-MCNC: 96 MG/DL (ref 65–99)
HBA1C MFR BLD: 5.7 % OF TOTAL HGB
HCT VFR BLD AUTO: 45.2 % (ref 38.5–50)
HDLC SERPL-MCNC: 55 MG/DL
HGB BLD-MCNC: 14.7 G/DL (ref 13.2–17.1)
LDLC SERPL CALC-MCNC: 82 MG/DL (CALC)
LYMPHOCYTES # BLD AUTO: 1037 CELLS/UL (ref 850–3900)
LYMPHOCYTES NFR BLD AUTO: 18.2 %
MCH RBC QN AUTO: 29.4 PG (ref 27–33)
MCHC RBC AUTO-ENTMCNC: 32.5 G/DL (ref 32–36)
MCV RBC AUTO: 90.4 FL (ref 80–100)
MONOCYTES # BLD AUTO: 331 CELLS/UL (ref 200–950)
MONOCYTES NFR BLD AUTO: 5.8 %
NEUTROPHILS # BLD AUTO: 3962 CELLS/UL (ref 1500–7800)
NEUTROPHILS NFR BLD AUTO: 69.5 %
NONHDLC SERPL-MCNC: 99 MG/DL (CALC)
PLATELET # BLD AUTO: 218 THOUSAND/UL (ref 140–400)
PMV BLD REES-ECKER: 10.7 FL (ref 7.5–12.5)
POTASSIUM SERPL-SCNC: 5 MMOL/L (ref 3.5–5.3)
PROT SERPL-MCNC: 6.8 G/DL (ref 6.1–8.1)
PSA SERPL-MCNC: 0.6 NG/ML
RBC # BLD AUTO: 5 MILLION/UL (ref 4.2–5.8)
SL AMB EGFR AFRICAN AMERICAN: 82 ML/MIN/1.73M2
SL AMB EGFR NON AFRICAN AMERICAN: 71 ML/MIN/1.73M2
SODIUM SERPL-SCNC: 140 MMOL/L (ref 135–146)
TRIGL SERPL-MCNC: 91 MG/DL
TSH SERPL-ACNC: 1.97 MIU/L (ref 0.4–4.5)
VIT B12 SERPL-MCNC: 1654 PG/ML (ref 200–1100)
WBC # BLD AUTO: 5.7 THOUSAND/UL (ref 3.8–10.8)

## 2020-05-21 PROCEDURE — 3044F HG A1C LEVEL LT 7.0%: CPT | Performed by: FAMILY MEDICINE

## 2020-05-27 ENCOUNTER — OFFICE VISIT (OUTPATIENT)
Dept: FAMILY MEDICINE CLINIC | Facility: CLINIC | Age: 60
End: 2020-05-27
Payer: COMMERCIAL

## 2020-05-27 VITALS
OXYGEN SATURATION: 98 % | WEIGHT: 190.8 LBS | SYSTOLIC BLOOD PRESSURE: 128 MMHG | RESPIRATION RATE: 16 BRPM | BODY MASS INDEX: 25.29 KG/M2 | DIASTOLIC BLOOD PRESSURE: 70 MMHG | HEIGHT: 73 IN | TEMPERATURE: 97.6 F | HEART RATE: 77 BPM

## 2020-05-27 DIAGNOSIS — I10 ESSENTIAL HYPERTENSION: Chronic | ICD-10-CM

## 2020-05-27 DIAGNOSIS — E53.8 VITAMIN B12 DEFICIENCY: ICD-10-CM

## 2020-05-27 DIAGNOSIS — Z00.00 ANNUAL PHYSICAL EXAM: Primary | ICD-10-CM

## 2020-05-27 DIAGNOSIS — C44.90 NONMELANOMA SKIN CANCER: Chronic | ICD-10-CM

## 2020-05-27 DIAGNOSIS — Z13.6 SCREENING FOR CARDIOVASCULAR CONDITION: ICD-10-CM

## 2020-05-27 DIAGNOSIS — E78.00 PURE HYPERCHOLESTEROLEMIA: Chronic | ICD-10-CM

## 2020-05-27 DIAGNOSIS — N40.0 BENIGN PROSTATIC HYPERPLASIA WITHOUT LOWER URINARY TRACT SYMPTOMS: Chronic | ICD-10-CM

## 2020-05-27 DIAGNOSIS — E55.9 VITAMIN D DEFICIENCY: ICD-10-CM

## 2020-05-27 DIAGNOSIS — K52.9 COLITIS: Chronic | ICD-10-CM

## 2020-05-27 DIAGNOSIS — R73.03 PREDIABETES: Chronic | ICD-10-CM

## 2020-05-27 DIAGNOSIS — Z13.1 SCREENING FOR DIABETES MELLITUS: ICD-10-CM

## 2020-05-27 PROCEDURE — 3074F SYST BP LT 130 MM HG: CPT | Performed by: FAMILY MEDICINE

## 2020-05-27 PROCEDURE — 3008F BODY MASS INDEX DOCD: CPT | Performed by: FAMILY MEDICINE

## 2020-05-27 PROCEDURE — 99396 PREV VISIT EST AGE 40-64: CPT | Performed by: FAMILY MEDICINE

## 2020-05-27 PROCEDURE — 3078F DIAST BP <80 MM HG: CPT | Performed by: FAMILY MEDICINE

## 2020-08-04 DIAGNOSIS — I10 ESSENTIAL HYPERTENSION: ICD-10-CM

## 2020-08-04 PROCEDURE — 4010F ACE/ARB THERAPY RXD/TAKEN: CPT | Performed by: FAMILY MEDICINE

## 2020-08-04 RX ORDER — LOSARTAN POTASSIUM 25 MG/1
50 TABLET ORAL DAILY
Qty: 180 TABLET | Refills: 1 | Status: SHIPPED | OUTPATIENT
Start: 2020-08-04 | End: 2020-11-04

## 2020-11-04 DIAGNOSIS — I10 ESSENTIAL HYPERTENSION: ICD-10-CM

## 2020-11-04 PROCEDURE — 4010F ACE/ARB THERAPY RXD/TAKEN: CPT | Performed by: FAMILY MEDICINE

## 2020-11-04 RX ORDER — LOSARTAN POTASSIUM 50 MG/1
TABLET ORAL
Qty: 90 TABLET | Refills: 1 | Status: SHIPPED | OUTPATIENT
Start: 2020-11-04 | End: 2021-04-27 | Stop reason: SDUPTHER

## 2020-11-18 LAB
25(OH)D3 SERPL-MCNC: 35 NG/ML (ref 30–100)
ALBUMIN SERPL-MCNC: 4.5 G/DL (ref 3.6–5.1)
ALBUMIN/GLOB SERPL: 2 (CALC) (ref 1–2.5)
ALP SERPL-CCNC: 60 U/L (ref 35–144)
ALT SERPL-CCNC: 28 U/L (ref 9–46)
AST SERPL-CCNC: 24 U/L (ref 10–35)
BILIRUB SERPL-MCNC: 0.5 MG/DL (ref 0.2–1.2)
BUN SERPL-MCNC: 26 MG/DL (ref 7–25)
BUN/CREAT SERPL: 22 (CALC) (ref 6–22)
CALCIUM SERPL-MCNC: 9.6 MG/DL (ref 8.6–10.3)
CHLORIDE SERPL-SCNC: 105 MMOL/L (ref 98–110)
CHOLEST SERPL-MCNC: 194 MG/DL
CHOLEST/HDLC SERPL: 3.1 (CALC)
CO2 SERPL-SCNC: 31 MMOL/L (ref 20–32)
CREAT SERPL-MCNC: 1.18 MG/DL (ref 0.7–1.25)
EST. AVERAGE GLUCOSE BLD GHB EST-MCNC: 114 (CALC)
EST. AVERAGE GLUCOSE BLD GHB EST-SCNC: 6.3 (CALC)
GLOBULIN SER CALC-MCNC: 2.3 G/DL (CALC) (ref 1.9–3.7)
GLUCOSE SERPL-MCNC: 105 MG/DL (ref 65–99)
HBA1C MFR BLD: 5.6 % OF TOTAL HGB
HDLC SERPL-MCNC: 62 MG/DL
LDLC SERPL CALC-MCNC: 109 MG/DL (CALC)
NONHDLC SERPL-MCNC: 132 MG/DL (CALC)
POTASSIUM SERPL-SCNC: 4.7 MMOL/L (ref 3.5–5.3)
PROT SERPL-MCNC: 6.8 G/DL (ref 6.1–8.1)
SL AMB EGFR AFRICAN AMERICAN: 77 ML/MIN/1.73M2
SL AMB EGFR NON AFRICAN AMERICAN: 67 ML/MIN/1.73M2
SODIUM SERPL-SCNC: 143 MMOL/L (ref 135–146)
TRIGL SERPL-MCNC: 120 MG/DL
VIT B12 SERPL-MCNC: 1725 PG/ML (ref 200–1100)

## 2020-11-18 PROCEDURE — 3044F HG A1C LEVEL LT 7.0%: CPT | Performed by: FAMILY MEDICINE

## 2020-11-23 PROBLEM — K51.919 ULCERATIVE COLITIS, CHRONIC, UNSPECIFIED COMPLICATION (HCC): Status: ACTIVE | Noted: 2020-11-23

## 2020-11-24 ENCOUNTER — OFFICE VISIT (OUTPATIENT)
Dept: FAMILY MEDICINE CLINIC | Facility: CLINIC | Age: 60
End: 2020-11-24
Payer: COMMERCIAL

## 2020-11-24 VITALS
BODY MASS INDEX: 25.42 KG/M2 | TEMPERATURE: 95.1 F | DIASTOLIC BLOOD PRESSURE: 78 MMHG | RESPIRATION RATE: 18 BRPM | OXYGEN SATURATION: 97 % | HEART RATE: 80 BPM | SYSTOLIC BLOOD PRESSURE: 120 MMHG | WEIGHT: 192.8 LBS

## 2020-11-24 DIAGNOSIS — I10 ESSENTIAL HYPERTENSION: Primary | Chronic | ICD-10-CM

## 2020-11-24 DIAGNOSIS — K51.919 ULCERATIVE COLITIS, CHRONIC, UNSPECIFIED COMPLICATION (HCC): ICD-10-CM

## 2020-11-24 DIAGNOSIS — R73.03 PREDIABETES: Chronic | ICD-10-CM

## 2020-11-24 DIAGNOSIS — E78.00 PURE HYPERCHOLESTEROLEMIA: Chronic | ICD-10-CM

## 2020-11-24 DIAGNOSIS — Z12.5 SCREENING FOR PROSTATE CANCER: ICD-10-CM

## 2020-11-24 PROCEDURE — 3725F SCREEN DEPRESSION PERFORMED: CPT | Performed by: FAMILY MEDICINE

## 2020-11-24 PROCEDURE — 3074F SYST BP LT 130 MM HG: CPT | Performed by: FAMILY MEDICINE

## 2020-11-24 PROCEDURE — 3078F DIAST BP <80 MM HG: CPT | Performed by: FAMILY MEDICINE

## 2020-11-24 PROCEDURE — 1036F TOBACCO NON-USER: CPT | Performed by: FAMILY MEDICINE

## 2020-11-24 PROCEDURE — 99214 OFFICE O/P EST MOD 30 MIN: CPT | Performed by: FAMILY MEDICINE

## 2020-11-24 RX ORDER — METHYLPREDNISOLONE 4 MG
TABLET, DOSE PACK ORAL
COMMUNITY

## 2021-03-10 DIAGNOSIS — Z23 ENCOUNTER FOR IMMUNIZATION: ICD-10-CM

## 2021-03-18 ENCOUNTER — IMMUNIZATIONS (OUTPATIENT)
Dept: FAMILY MEDICINE CLINIC | Facility: HOSPITAL | Age: 61
End: 2021-03-18

## 2021-03-18 DIAGNOSIS — Z23 ENCOUNTER FOR IMMUNIZATION: Primary | ICD-10-CM

## 2021-03-18 PROCEDURE — 91300 SARS-COV-2 / COVID-19 MRNA VACCINE (PFIZER-BIONTECH) 30 MCG: CPT

## 2021-03-18 PROCEDURE — 0001A SARS-COV-2 / COVID-19 MRNA VACCINE (PFIZER-BIONTECH) 30 MCG: CPT

## 2021-04-08 ENCOUNTER — IMMUNIZATIONS (OUTPATIENT)
Dept: FAMILY MEDICINE CLINIC | Facility: HOSPITAL | Age: 61
End: 2021-04-08

## 2021-04-08 DIAGNOSIS — Z23 ENCOUNTER FOR IMMUNIZATION: Primary | ICD-10-CM

## 2021-04-08 PROCEDURE — 91300 SARS-COV-2 / COVID-19 MRNA VACCINE (PFIZER-BIONTECH) 30 MCG: CPT

## 2021-04-08 PROCEDURE — 0002A SARS-COV-2 / COVID-19 MRNA VACCINE (PFIZER-BIONTECH) 30 MCG: CPT

## 2021-04-27 DIAGNOSIS — I10 ESSENTIAL HYPERTENSION: ICD-10-CM

## 2021-04-27 DIAGNOSIS — E11.9 TYPE 2 DIABETES MELLITUS WITHOUT COMPLICATION, WITHOUT LONG-TERM CURRENT USE OF INSULIN (HCC): ICD-10-CM

## 2021-04-27 RX ORDER — LOSARTAN POTASSIUM 50 MG/1
50 TABLET ORAL DAILY
Qty: 90 TABLET | Refills: 1 | Status: SHIPPED | OUTPATIENT
Start: 2021-04-27 | End: 2021-10-11 | Stop reason: SDUPTHER

## 2021-06-08 LAB
ALBUMIN SERPL-MCNC: 4.3 G/DL (ref 3.6–5.1)
ALBUMIN/GLOB SERPL: 2 (CALC) (ref 1–2.5)
ALP SERPL-CCNC: 56 U/L (ref 35–144)
ALT SERPL-CCNC: 22 U/L (ref 9–46)
AST SERPL-CCNC: 22 U/L (ref 10–35)
BILIRUB SERPL-MCNC: 0.9 MG/DL (ref 0.2–1.2)
BUN SERPL-MCNC: 19 MG/DL (ref 7–25)
BUN/CREAT SERPL: NORMAL (CALC) (ref 6–22)
CALCIUM SERPL-MCNC: 9.5 MG/DL (ref 8.6–10.3)
CHLORIDE SERPL-SCNC: 103 MMOL/L (ref 98–110)
CHOLEST SERPL-MCNC: 192 MG/DL
CHOLEST/HDLC SERPL: 3.5 (CALC)
CO2 SERPL-SCNC: 31 MMOL/L (ref 20–32)
CREAT SERPL-MCNC: 0.96 MG/DL (ref 0.7–1.25)
EST. AVERAGE GLUCOSE BLD GHB EST-MCNC: 114 (CALC)
EST. AVERAGE GLUCOSE BLD GHB EST-SCNC: 6.3 (CALC)
GLOBULIN SER CALC-MCNC: 2.2 G/DL (CALC) (ref 1.9–3.7)
GLUCOSE SERPL-MCNC: 99 MG/DL (ref 65–99)
HBA1C MFR BLD: 5.6 % OF TOTAL HGB
HDLC SERPL-MCNC: 55 MG/DL
LDLC SERPL CALC-MCNC: 106 MG/DL (CALC)
NONHDLC SERPL-MCNC: 137 MG/DL (CALC)
POTASSIUM SERPL-SCNC: 4.9 MMOL/L (ref 3.5–5.3)
PROT SERPL-MCNC: 6.5 G/DL (ref 6.1–8.1)
PSA SERPL-MCNC: 0.4 NG/ML
SL AMB EGFR AFRICAN AMERICAN: 99 ML/MIN/1.73M2
SL AMB EGFR NON AFRICAN AMERICAN: 86 ML/MIN/1.73M2
SODIUM SERPL-SCNC: 141 MMOL/L (ref 135–146)
TRIGL SERPL-MCNC: 185 MG/DL

## 2021-06-08 PROCEDURE — 3044F HG A1C LEVEL LT 7.0%: CPT | Performed by: FAMILY MEDICINE

## 2021-06-15 ENCOUNTER — OFFICE VISIT (OUTPATIENT)
Dept: FAMILY MEDICINE CLINIC | Facility: CLINIC | Age: 61
End: 2021-06-15
Payer: COMMERCIAL

## 2021-06-15 VITALS
BODY MASS INDEX: 25.31 KG/M2 | DIASTOLIC BLOOD PRESSURE: 78 MMHG | WEIGHT: 191 LBS | HEART RATE: 73 BPM | TEMPERATURE: 97.6 F | SYSTOLIC BLOOD PRESSURE: 130 MMHG | RESPIRATION RATE: 18 BRPM | OXYGEN SATURATION: 97 % | HEIGHT: 73 IN

## 2021-06-15 DIAGNOSIS — K51.919 ULCERATIVE COLITIS, CHRONIC, UNSPECIFIED COMPLICATION (HCC): ICD-10-CM

## 2021-06-15 DIAGNOSIS — I10 ESSENTIAL HYPERTENSION: Chronic | ICD-10-CM

## 2021-06-15 DIAGNOSIS — R73.03 PREDIABETES: Chronic | ICD-10-CM

## 2021-06-15 DIAGNOSIS — E78.00 PURE HYPERCHOLESTEROLEMIA: Chronic | ICD-10-CM

## 2021-06-15 DIAGNOSIS — Z00.00 ANNUAL PHYSICAL EXAM: Primary | ICD-10-CM

## 2021-06-15 PROCEDURE — 99396 PREV VISIT EST AGE 40-64: CPT | Performed by: FAMILY MEDICINE

## 2021-06-15 PROCEDURE — 1036F TOBACCO NON-USER: CPT | Performed by: FAMILY MEDICINE

## 2021-06-15 PROCEDURE — 3008F BODY MASS INDEX DOCD: CPT | Performed by: FAMILY MEDICINE

## 2021-06-15 PROCEDURE — 3075F SYST BP GE 130 - 139MM HG: CPT | Performed by: FAMILY MEDICINE

## 2021-06-15 PROCEDURE — 3078F DIAST BP <80 MM HG: CPT | Performed by: FAMILY MEDICINE

## 2021-06-15 PROCEDURE — 3725F SCREEN DEPRESSION PERFORMED: CPT | Performed by: FAMILY MEDICINE

## 2021-06-15 RX ORDER — MELATONIN
1000 DAILY
COMMUNITY

## 2021-06-15 NOTE — PROGRESS NOTES
[facility]    NAME: Jony Nix  AGE: 61 y o  SEX: male  : 1960     DATE: 6/15/2021     Assessment and Plan:     Problem List Items Addressed This Visit        Digestive    Ulcerative colitis, chronic, unspecified complication ([address] )       Cardiovascular and Mediastinum    Hypertension (Chronic)       Other    Hyperlipidemia (Chronic)    Relevant Orders    Lipid Panel with Direct LDL reflex    Prediabetes (Chronic)    Relevant Orders    Comprehensive metabolic panel    HEMOGLOBIN A1C W/ EAG ESTIMATION      Other Visit Diagnoses     Annual physical exam    -  Primary    Emilie Krishna appears well   he is to continue a lower carb/salt/saturated fat diet, and regular exercise  Repeat FBW prior to next OV  Immunizations and preventive care screenings were discussed with patient today  Appropriate education was printed on patient's after visit summary  Counseling:  Dental Health: discussed importance of regular tooth brushing, flossing, and dental visits  · Exercise: the importance of regular exercise/physical activity was discussed  Recommend exercise 3-5 times per week for at least 30 minutes  BMI Counseling: Body mass index is 25 18 kg/m²  The BMI is above normal  Nutrition recommendations include moderation in carbohydrate intake and reducing intake of saturated and trans fat  Exercise recommendations include exercising 3-5 times per week  No pharmacotherapy was ordered  Patient referred to PCP due to patient being overweight  Return in 6 months (on 12/15/2021) for Recheck  Chief Complaint:     Chief Complaint   Patient presents with    Annual Exam      History of Present Illness:     Adult Annual Physical   Patient here for a comprehensive physical exam  The patient reports no problems  Emilie Krishna had colonoscopy with Dr Stephanie Ramirez in 2020, and then a f/u sigmoidoscopy in 2021    Recent labs:  A1c 5 6%, Gluc 99, HDL 55, , Cr/LFTs normal, PSA 0 4  Pt completed the Alfredo Olson COV-19 vaccine series on 4/8/21, had Tdap in 2013  Seth Niño thinks that around the time he had the vaccines, that he may have actually have had COV-19  Diet and Physical Activity  · Diet/Nutrition: well balanced diet  · Exercise: 3-4 times a week on average  Depression Screening  PHQ-9 Depression Screening    PHQ-9:   Frequency of the following problems over the past two weeks:      Little interest or pleasure in doing things: 0 - not at all  Feeling down, depressed, or hopeless: 0 - not at all  PHQ-2 Score: 0       General Health  · Sleep: sleeps well  · Hearing: normal - bilateral   · Vision: no vision problems  · Dental: regular dental visits   Health  · Symptoms include: none     Review of Systems:     Review of Systems   Constitutional: Negative for activity change  Eyes: Negative for visual disturbance  Respiratory: Negative for shortness of breath  Cardiovascular: Negative for chest pain  Gastrointestinal: Negative for abdominal pain and blood in stool  Genitourinary: Negative for decreased urine volume and difficulty urinating  Neurological: Negative for headaches  Psychiatric/Behavioral: Negative for dysphoric mood  The patient is not nervous/anxious  Past Medical History:     Past Medical History:   Diagnosis Date    Allergic     Joint pain     Laceration of lower leg     last assessed 06/06/13      Past Surgical History:     History reviewed  No pertinent surgical history     Family History:     Family History   Problem Relation Age of Onset    No Known Problems Mother       Social History:     Social History     Socioeconomic History    Marital status: /Civil Union     Spouse name: None    Number of children: None    Years of education: None    Highest education level: None   Occupational History    None   Tobacco Use    Smoking status: Never Smoker    Smokeless tobacco: Never Used   Substance and Sexual Activity    Alcohol use: Yes     Comment: occasional    Drug use: None    Sexual activity: None   Other Topics Concern    None   Social History Narrative    None     Social Determinants of Health     Financial Resource Strain:     Difficulty of Paying Living Expenses:    Food Insecurity:     Worried About Running Out of Food in the Last Year:     [address] in the Last Year:    Transportation Needs:     Lack of Transportation (Medical):  Lack of Transportation (Non-Medical):    Physical Activity:     Days of Exercise per Week:     Minutes of Exercise per Session:    Stress:     Feeling of Stress :    Social Connections:     Frequency of Communication with Friends and Family:     Frequency of Social Gatherings with Friends and Family:     Attends [employer] Services:     Active Member of Clubs or Organizations:     Attends Club or Organization Meetings:     Marital Status:    Intimate Partner Violence:     Fear of Current or Ex-Partner:     Emotionally Abused:     Physically Abused:     Sexually Abused:       Current Medications:     Current Outpatient Medications   Medication Sig Dispense Refill    aspirin (ASPIRIN LOW DOSE) 81 MG tablet Take by mouth Take one tab twice weekly       Calcium Polycarbophil (FIBER-CAPS PO)       cholecalciferol (VITAMIN D3) 1,000 units tablet Take 1,000 Units by mouth daily      cyanocobalamin (VITAMIN B-12) 1,000 mcg tablet Take by mouth daily      Fish Oil-Cholecalciferol (FISH OIL + D3 PO) Take by mouth      fluticasone (FLONASE) 50 mcg/act nasal spray into each nostril      Glucosamine Sulfate 500 MG TABS       losartan (COZAAR) 50 mg tablet Take 1 tablet (50 mg total) by mouth daily 90 tablet 1    mesalamine (ASACOL) 800 MG EC tablet Take 1 tablet by moth three times a day   270 tablet 3    metFORMIN (GLUCOPHAGE) 500 mg tablet Take 1 tablet (500 mg total) by mouth 2 (two) times a day 180 tablet 3    Multiple Vitamins-Iron (QC DAILY MULTIVITAMINS/IRON) TABS Take by mouth      RESTASIS 0 05 % ophthalmic emulsion once   3    rosuvastatin (CRESTOR) 5 mg tablet Take 1 tablet (5 mg total) by mouth daily Take 1 tab three times weekly 90 tablet 3    Probiotic Product (PROBIOTIC DAILY) CAPS Take by mouth       No current facility-administered medications for this visit  Allergies: Allergies   Allergen Reactions    Penicillins     Seasonal Ic [Cholestatin]       Physical Exam:     /78 (BP Location: Left arm, Patient Position: Sitting, Cuff Size: Large)   Pulse 73   Temp 97 6 °F (36 4 °C) (Tympanic)   Resp 18   Ht 6' 1 03" (1 855 m)   Wt 86 6 kg (191 lb)   SpO2 97%   BMI 25 18 kg/m²     Physical Exam  Vitals and nursing note reviewed  Constitutional:       General: He is not in acute distress  Appearance: Normal appearance  He is not ill-appearing, toxic-appearing or diaphoretic  HENT:      Head: Normocephalic and atraumatic  Eyes:      General: No scleral icterus  Conjunctiva/sclera: Conjunctivae normal    Cardiovascular:      Rate and Rhythm: Normal rate and regular rhythm  Heart sounds: Normal heart sounds  No murmur heard  No friction rub  No gallop  Pulmonary:      Effort: Pulmonary effort is normal  No respiratory distress  Breath sounds: Normal breath sounds  No stridor  No wheezing, rhonchi or rales  Abdominal:      General: Abdomen is flat  Bowel sounds are normal  There is no distension  Palpations: Abdomen is soft  There is no mass  Tenderness: There is no abdominal tenderness  There is no guarding or rebound  Genitourinary:     Prostate: Enlarged  Not tender and no nodules present  Rectum: Normal       Comments: No gross blood on the examining finger  Prostate is +1 sized on exam, but is smooth throughout  Musculoskeletal:      Cervical back: Normal range of motion and neck supple  No rigidity or tenderness     Lymphadenopathy: Cervical: No cervical adenopathy  Neurological:      Mental Status: He is alert and oriented to person, place, and time  Psychiatric:         Mood and Affect: Mood normal          Behavior: Behavior normal          Thought Content: Thought content normal          Judgment: Judgment normal       Javier Chapman was seen today for annual exam     Diagnoses and all orders for this visit:    Annual physical exam  Comments:  Jeferson Pires appears well   he is to continue a lower carb/salt/saturated fat diet, and regular exercise  Repeat FBW prior to next OV  Ulcerative colitis, chronic, unspecified complication ([facility] [address] )  Comments:  Stable on present management - followed by [facility]  Prediabetes  Comments:  Stable at this time; diet and exercise as above - A1c incl with next labs  Orders:  -     Comprehensive metabolic panel; Future  -     HEMOGLOBIN A1C W/ EAG ESTIMATION; Future    Essential hypertension  Comments:  Controlled on present management  Pure hypercholesterolemia  Comments:  Controlled with low dose Rosuvastatin  Orders:  -     Lipid Panel with Direct LDL reflex;  Future          [address] Coretta Smith, DO  [address]

## 2021-06-15 NOTE — PATIENT INSTRUCTIONS

## 2021-07-18 DIAGNOSIS — E11.9 TYPE 2 DIABETES MELLITUS WITHOUT COMPLICATION, WITHOUT LONG-TERM CURRENT USE OF INSULIN (HCC): ICD-10-CM

## 2021-07-18 DIAGNOSIS — E78.5 HYPERLIPIDEMIA, UNSPECIFIED HYPERLIPIDEMIA TYPE: Chronic | ICD-10-CM

## 2021-07-18 RX ORDER — ROSUVASTATIN CALCIUM 5 MG/1
TABLET, COATED ORAL
Qty: 39 TABLET | Refills: 9 | Status: SHIPPED | OUTPATIENT
Start: 2021-07-18

## 2021-10-11 DIAGNOSIS — I10 ESSENTIAL HYPERTENSION: ICD-10-CM

## 2021-10-11 RX ORDER — LOSARTAN POTASSIUM 50 MG/1
50 TABLET ORAL DAILY
Qty: 90 TABLET | Refills: 3 | Status: SHIPPED | OUTPATIENT
Start: 2021-10-11

## 2021-10-30 ENCOUNTER — IMMUNIZATIONS (OUTPATIENT)
Dept: FAMILY MEDICINE CLINIC | Facility: HOSPITAL | Age: 61
End: 2021-10-30

## 2021-10-30 DIAGNOSIS — Z23 ENCOUNTER FOR IMMUNIZATION: Primary | ICD-10-CM

## 2021-10-30 PROCEDURE — 91300 COVID-19 PFIZER VACC 0.3 ML: CPT

## 2021-10-30 PROCEDURE — 0001A COVID-19 PFIZER VACC 0.3 ML: CPT

## 2021-12-07 LAB
ALBUMIN SERPL-MCNC: 4.3 G/DL (ref 3.6–5.1)
ALBUMIN/GLOB SERPL: 1.8 (CALC) (ref 1–2.5)
ALP SERPL-CCNC: 64 U/L (ref 35–144)
ALT SERPL-CCNC: 20 U/L (ref 9–46)
AST SERPL-CCNC: 19 U/L (ref 10–35)
BILIRUB SERPL-MCNC: 0.4 MG/DL (ref 0.2–1.2)
BUN SERPL-MCNC: 23 MG/DL (ref 7–25)
BUN/CREAT SERPL: ABNORMAL (CALC) (ref 6–22)
CALCIUM SERPL-MCNC: 9.4 MG/DL (ref 8.6–10.3)
CHLORIDE SERPL-SCNC: 104 MMOL/L (ref 98–110)
CHOLEST SERPL-MCNC: 173 MG/DL
CHOLEST/HDLC SERPL: 3.1 (CALC)
CO2 SERPL-SCNC: 30 MMOL/L (ref 20–32)
CREAT SERPL-MCNC: 1.14 MG/DL (ref 0.7–1.25)
EST. AVERAGE GLUCOSE BLD GHB EST-MCNC: 108 MG/DL
EST. AVERAGE GLUCOSE BLD GHB EST-SCNC: 6 MMOL/L
GLOBULIN SER CALC-MCNC: 2.4 G/DL (CALC) (ref 1.9–3.7)
GLUCOSE SERPL-MCNC: 106 MG/DL (ref 65–99)
HBA1C MFR BLD: 5.4 % OF TOTAL HGB
HDLC SERPL-MCNC: 56 MG/DL
LDLC SERPL CALC-MCNC: 93 MG/DL (CALC)
NONHDLC SERPL-MCNC: 117 MG/DL (CALC)
POTASSIUM SERPL-SCNC: 4.5 MMOL/L (ref 3.5–5.3)
PROT SERPL-MCNC: 6.7 G/DL (ref 6.1–8.1)
SL AMB EGFR AFRICAN AMERICAN: 80 ML/MIN/1.73M2
SL AMB EGFR NON AFRICAN AMERICAN: 69 ML/MIN/1.73M2
SODIUM SERPL-SCNC: 139 MMOL/L (ref 135–146)
TRIGL SERPL-MCNC: 142 MG/DL

## 2021-12-07 PROCEDURE — 3044F HG A1C LEVEL LT 7.0%: CPT | Performed by: FAMILY MEDICINE

## 2021-12-15 ENCOUNTER — OFFICE VISIT (OUTPATIENT)
Dept: FAMILY MEDICINE CLINIC | Facility: CLINIC | Age: 61
End: 2021-12-15
Payer: COMMERCIAL

## 2021-12-15 VITALS
WEIGHT: 193.2 LBS | SYSTOLIC BLOOD PRESSURE: 130 MMHG | HEART RATE: 86 BPM | OXYGEN SATURATION: 100 % | TEMPERATURE: 96.7 F | BODY MASS INDEX: 25.6 KG/M2 | DIASTOLIC BLOOD PRESSURE: 84 MMHG | RESPIRATION RATE: 14 BRPM | HEIGHT: 73 IN

## 2021-12-15 DIAGNOSIS — R73.03 PREDIABETES: Primary | Chronic | ICD-10-CM

## 2021-12-15 DIAGNOSIS — K51.919 ULCERATIVE COLITIS, CHRONIC, UNSPECIFIED COMPLICATION (HCC): ICD-10-CM

## 2021-12-15 DIAGNOSIS — E78.00 PURE HYPERCHOLESTEROLEMIA: Chronic | ICD-10-CM

## 2021-12-15 DIAGNOSIS — Z12.5 SCREENING FOR PROSTATE CANCER: ICD-10-CM

## 2021-12-15 DIAGNOSIS — I10 PRIMARY HYPERTENSION: Chronic | ICD-10-CM

## 2021-12-15 PROCEDURE — 1036F TOBACCO NON-USER: CPT | Performed by: FAMILY MEDICINE

## 2021-12-15 PROCEDURE — 99214 OFFICE O/P EST MOD 30 MIN: CPT | Performed by: FAMILY MEDICINE

## 2021-12-15 PROCEDURE — 3008F BODY MASS INDEX DOCD: CPT | Performed by: FAMILY MEDICINE

## 2022-06-15 LAB
ALBUMIN SERPL-MCNC: 4.2 G/DL (ref 3.6–5.1)
ALBUMIN/GLOB SERPL: 2 (CALC) (ref 1–2.5)
ALP SERPL-CCNC: 62 U/L (ref 35–144)
ALT SERPL-CCNC: 16 U/L (ref 9–46)
AST SERPL-CCNC: 17 U/L (ref 10–35)
BILIRUB SERPL-MCNC: 0.4 MG/DL (ref 0.2–1.2)
BUN SERPL-MCNC: 21 MG/DL (ref 7–25)
BUN/CREAT SERPL: NORMAL (CALC) (ref 6–22)
CALCIUM SERPL-MCNC: 9.4 MG/DL (ref 8.6–10.3)
CHLORIDE SERPL-SCNC: 106 MMOL/L (ref 98–110)
CHOLEST SERPL-MCNC: 147 MG/DL
CHOLEST/HDLC SERPL: 2.8 (CALC)
CO2 SERPL-SCNC: 30 MMOL/L (ref 20–32)
CREAT SERPL-MCNC: 1.05 MG/DL (ref 0.7–1.25)
EST. AVERAGE GLUCOSE BLD GHB EST-MCNC: 117 MG/DL
EST. AVERAGE GLUCOSE BLD GHB EST-SCNC: 6.5 MMOL/L
GLOBULIN SER CALC-MCNC: 2.1 G/DL (CALC) (ref 1.9–3.7)
GLUCOSE SERPL-MCNC: 95 MG/DL (ref 65–99)
HBA1C MFR BLD: 5.7 % OF TOTAL HGB
HDLC SERPL-MCNC: 53 MG/DL
LDLC SERPL CALC-MCNC: 77 MG/DL (CALC)
NONHDLC SERPL-MCNC: 94 MG/DL (CALC)
POTASSIUM SERPL-SCNC: 4.7 MMOL/L (ref 3.5–5.3)
PROT SERPL-MCNC: 6.3 G/DL (ref 6.1–8.1)
PSA SERPL-MCNC: 0.46 NG/ML
SL AMB EGFR AFRICAN AMERICAN: 88 ML/MIN/1.73M2
SL AMB EGFR NON AFRICAN AMERICAN: 76 ML/MIN/1.73M2
SODIUM SERPL-SCNC: 141 MMOL/L (ref 135–146)
TRIGL SERPL-MCNC: 89 MG/DL

## 2022-06-15 PROCEDURE — 3044F HG A1C LEVEL LT 7.0%: CPT | Performed by: FAMILY MEDICINE

## 2022-06-21 ENCOUNTER — OFFICE VISIT (OUTPATIENT)
Dept: FAMILY MEDICINE CLINIC | Facility: CLINIC | Age: 62
End: 2022-06-21
Payer: COMMERCIAL

## 2022-06-21 VITALS
WEIGHT: 186 LBS | DIASTOLIC BLOOD PRESSURE: 86 MMHG | HEIGHT: 73 IN | RESPIRATION RATE: 12 BRPM | HEART RATE: 71 BPM | SYSTOLIC BLOOD PRESSURE: 122 MMHG | OXYGEN SATURATION: 98 % | BODY MASS INDEX: 24.65 KG/M2 | TEMPERATURE: 97.2 F

## 2022-06-21 DIAGNOSIS — Z00.00 ANNUAL PHYSICAL EXAM: Primary | ICD-10-CM

## 2022-06-21 DIAGNOSIS — E78.00 PURE HYPERCHOLESTEROLEMIA: Chronic | ICD-10-CM

## 2022-06-21 DIAGNOSIS — I10 PRIMARY HYPERTENSION: Chronic | ICD-10-CM

## 2022-06-21 DIAGNOSIS — K51.919 ULCERATIVE COLITIS, CHRONIC, UNSPECIFIED COMPLICATION (HCC): ICD-10-CM

## 2022-06-21 DIAGNOSIS — R73.03 PREDIABETES: Chronic | ICD-10-CM

## 2022-06-21 PROCEDURE — 3008F BODY MASS INDEX DOCD: CPT | Performed by: FAMILY MEDICINE

## 2022-06-21 PROCEDURE — 99396 PREV VISIT EST AGE 40-64: CPT | Performed by: FAMILY MEDICINE

## 2022-06-21 PROCEDURE — 3725F SCREEN DEPRESSION PERFORMED: CPT | Performed by: FAMILY MEDICINE

## 2022-06-21 PROCEDURE — 1036F TOBACCO NON-USER: CPT | Performed by: FAMILY MEDICINE

## 2022-06-21 NOTE — PROGRESS NOTES
[facility]    NAME: Nanci Garza  AGE: 64 y o  SEX: male  : 1960     DATE: 2022     Assessment and Plan:     Problem List Items Addressed This Visit        Digestive    Ulcerative colitis, chronic, unspecified complication ([address] )       Cardiovascular and Mediastinum    Hypertension (Chronic)       Other    Hyperlipidemia (Chronic)    Relevant Orders    Lipid Panel with Direct LDL reflex    Prediabetes (Chronic)    Relevant Orders    Comprehensive metabolic panel    HEMOGLOBIN A1C W/ EAG ESTIMATION      Other Visit Diagnoses     Annual physical exam    -  Primary    Mehreen Warren appears well  Pt to continue a lower carb/salt diet, regular exercise  Repeat FBW prior to next OV  Immunizations and preventive care screenings were discussed with patient today  Appropriate education was printed on patient's after visit summary  Counseling:  Dental Health: discussed importance of regular tooth brushing, flossing, and dental visits  · Exercise: the importance of regular exercise/physical activity was discussed  Recommend exercise 3-5 times per week for at least 30 minutes  Depression Screening and Follow-up Plan: Patient was screened for depression during today's encounter  They screened negative with a PHQ-2 score of 0  Return in 6 months (on 2022) for Recheck  Chief Complaint:     Chief Complaint   Patient presents with    Physical Exam     Patient being seen for physical       History of Present Illness:     Adult Annual Physical   Patient here for a comprehensive physical exam  The patient reports no problems  Had Sigmoidoscopy withy CR Surgery in 2021  Vaccinated against COV-19 - 3 doses  Tdap in   Recent labs reviewed, and good - PSA 0 46  Declines Tdap today  Has colonoscopy next  Diet and Physical Activity  · Diet/Nutrition: well balanced diet     · Exercise: 3-4 times a week on average  Depression Screening  PHQ-2/9 Depression Screening    Little interest or pleasure in doing things: 0 - not at all  Feeling down, depressed, or hopeless: 0 - not at all  PHQ-2 Score: 0  PHQ-2 Interpretation: Negative depression screen       General Health  · Sleep: sleeps well  · Hearing: normal - bilateral   · Vision: no vision problems  · Dental: regular dental visits   Health  · Symptoms include: none     Review of Systems:     Review of Systems   Constitutional: Negative for activity change  Respiratory: Negative for shortness of breath  Cardiovascular: Negative for chest pain  Gastrointestinal: Negative for abdominal pain and blood in stool  Genitourinary: Negative for decreased urine volume and difficulty urinating  Musculoskeletal: Positive for back pain  Chronic, mild back stiffness - declines PT right now  Psychiatric/Behavioral: Negative for dysphoric mood  The patient is not nervous/anxious  Past Medical History:     Past Medical History:   Diagnosis Date    Allergic     Joint pain     Laceration of lower leg     last assessed 06/06/13      Past Surgical History:     History reviewed  No pertinent surgical history     Family History:     Family History   Problem Relation Age of Onset    No Known Problems Mother       Social History:     Social History     Socioeconomic History    Marital status: /Civil Union     Spouse name: None    Number of children: None    Years of education: None    Highest education level: None   Occupational History    None   Tobacco Use    Smoking status: Never Smoker    Smokeless tobacco: Never Used   Vaping Use    Vaping Use: Never used   Substance and Sexual Activity    Alcohol use: Yes     Comment: occasional    Drug use: Not Currently    Sexual activity: None   Other Topics Concern    None   Social History Narrative    None     Social Determinants of Health     Financial Resource Strain: Not on file Food Insecurity: Not on file   Transportation Needs: Not on file   Physical Activity: Not on file   Stress: Not on file   Social Connections: Not on file   Intimate Partner Violence: Not on file   Housing Stability: Not on file      Current Medications:     Current Outpatient Medications   Medication Sig Dispense Refill    aspirin 81 MG tablet Take by mouth Take one tab twice weekly       Calcium Polycarbophil (FIBER-CAPS PO)       cholecalciferol (VITAMIN D3) 1,000 units tablet Take 1,000 Units by mouth daily      cyanocobalamin (VITAMIN B-12) 1,000 mcg tablet Take by mouth daily      Fish Oil-Cholecalciferol (FISH OIL + D3 PO) Take by mouth      fluticasone (FLONASE) 50 mcg/act nasal spray into each nostril      Glucosamine Sulfate 500 MG TABS       losartan (COZAAR) 50 mg tablet Take 1 tablet (50 mg total) by mouth daily 90 tablet 3    mesalamine (ASACOL) 800 MG EC tablet Take 1 tablet by moth three times a day  270 tablet 0    metFORMIN (GLUCOPHAGE) 500 mg tablet Take 1 tablet (500 mg total) by mouth 2 (two) times a day 180 tablet 1    Multiple Vitamins-Iron (QC DAILY MULTIVITAMINS/IRON) TABS Take by mouth      rosuvastatin (CRESTOR) 5 mg tablet TAKE 1 TABLET BY MOUTH 3 TIMES A WEEK 39 tablet 9     No current facility-administered medications for this visit  Allergies: Allergies   Allergen Reactions    Penicillins     Seasonal Ic [Cholestatin]       Physical Exam:     /86 (BP Location: Left arm, Patient Position: Sitting, Cuff Size: Standard)   Pulse 71   Temp (!) 97 2 °F (36 2 °C) (Tympanic)   Resp 12   Ht 6' 1 03" (1 855 m)   Wt 84 4 kg (186 lb)   SpO2 98%   BMI 24 52 kg/m²     Physical Exam  Vitals and nursing note reviewed  Constitutional:       General: He is not in acute distress  Appearance: Normal appearance  He is not ill-appearing, toxic-appearing or diaphoretic  HENT:      Head: Normocephalic and atraumatic  Eyes:      General: No scleral icterus  Conjunctiva/sclera: Conjunctivae normal    Cardiovascular:      Rate and Rhythm: Normal rate and regular rhythm  Heart sounds: Normal heart sounds  No murmur heard  No friction rub  No gallop  Pulmonary:      Effort: Pulmonary effort is normal  No respiratory distress  Breath sounds: Normal breath sounds  No stridor  No wheezing, rhonchi or rales  Abdominal:      General: Abdomen is flat  Bowel sounds are normal  There is no distension  Palpations: Abdomen is soft  There is no mass  Tenderness: There is no abdominal tenderness  There is no guarding or rebound  Genitourinary:     Prostate: Normal       Rectum: Normal       Comments: No gross blood on the examining finger  Musculoskeletal:      Cervical back: Normal range of motion and neck supple  No rigidity or tenderness  Lymphadenopathy:      Cervical: No cervical adenopathy  Neurological:      Mental Status: He is alert and oriented to person, place, and time  Psychiatric:         Mood and Affect: Mood normal          Behavior: Behavior normal          Thought Content: Thought content normal          Judgment: Judgment normal           Kiana Durbin was seen today for physical exam     Diagnoses and all orders for this visit:    Annual physical exam  Comments:  Matthew Talley appears well  Pt to continue a lower carb/salt diet, regular exercise  Repeat FBW prior to next OV  Ulcerative colitis, chronic, unspecified complication ([address] )  Comments:  Stable - sees CR Surgery in f/u soon  Primary hypertension  Comments:  Controlled on present management  Prediabetes  Comments:  Stable on present management  Orders:  -     Comprehensive metabolic panel; Future  -     HEMOGLOBIN A1C W/ EAG ESTIMATION; Future    Pure hypercholesterolemia  Comments:  Controlled with Rosuvastatin  Orders:  -     Lipid Panel with Direct LDL reflex;  Future        [address] Coretta Smith, DO  [facility]

## 2022-06-21 NOTE — PATIENT INSTRUCTIONS

## 2022-07-01 ENCOUNTER — APPOINTMENT (OUTPATIENT)
Dept: RADIOLOGY | Age: 62
End: 2022-07-01
Payer: COMMERCIAL

## 2022-07-01 DIAGNOSIS — S29.9XXA SOFT TISSUE INJURY OF RIGHT CHEST WALL: Primary | ICD-10-CM

## 2022-07-01 DIAGNOSIS — S29.9XXA SOFT TISSUE INJURY OF RIGHT CHEST WALL: ICD-10-CM

## 2022-07-01 PROCEDURE — 71101 X-RAY EXAM UNILAT RIBS/CHEST: CPT

## 2022-09-24 DIAGNOSIS — I10 ESSENTIAL HYPERTENSION: ICD-10-CM

## 2022-09-24 DIAGNOSIS — E11.9 TYPE 2 DIABETES MELLITUS WITHOUT COMPLICATION, WITHOUT LONG-TERM CURRENT USE OF INSULIN (HCC): ICD-10-CM

## 2022-09-24 DIAGNOSIS — E78.5 HYPERLIPIDEMIA, UNSPECIFIED HYPERLIPIDEMIA TYPE: Chronic | ICD-10-CM

## 2022-09-24 RX ORDER — ROSUVASTATIN CALCIUM 5 MG/1
TABLET, COATED ORAL
Qty: 39 TABLET | Refills: 9 | Status: SHIPPED | OUTPATIENT
Start: 2022-09-24

## 2022-09-24 RX ORDER — LOSARTAN POTASSIUM 50 MG/1
TABLET ORAL
Qty: 90 TABLET | Refills: 3 | Status: SHIPPED | OUTPATIENT
Start: 2022-09-24

## 2022-12-15 LAB
ALBUMIN SERPL-MCNC: 4.5 G/DL (ref 3.6–5.1)
ALBUMIN/GLOB SERPL: 2 (CALC) (ref 1–2.5)
ALP SERPL-CCNC: 64 U/L (ref 35–144)
ALT SERPL-CCNC: 26 U/L (ref 9–46)
AST SERPL-CCNC: 21 U/L (ref 10–35)
BILIRUB SERPL-MCNC: 0.5 MG/DL (ref 0.2–1.2)
BUN SERPL-MCNC: 25 MG/DL (ref 7–25)
BUN/CREAT SERPL: 17 (CALC) (ref 6–22)
CALCIUM SERPL-MCNC: 9.9 MG/DL (ref 8.6–10.3)
CHLORIDE SERPL-SCNC: 104 MMOL/L (ref 98–110)
CHOLEST SERPL-MCNC: 185 MG/DL
CHOLEST/HDLC SERPL: 3.6 (CALC)
CO2 SERPL-SCNC: 30 MMOL/L (ref 20–32)
CREAT SERPL-MCNC: 1.44 MG/DL (ref 0.7–1.35)
EST. AVERAGE GLUCOSE BLD GHB EST-MCNC: 114 MG/DL
EST. AVERAGE GLUCOSE BLD GHB EST-SCNC: 6.3 MMOL/L
GFR/BSA.PRED SERPLBLD CYS-BASED-ARV: 55 ML/MIN/1.73M2
GLOBULIN SER CALC-MCNC: 2.3 G/DL (CALC) (ref 1.9–3.7)
GLUCOSE SERPL-MCNC: 113 MG/DL (ref 65–99)
HBA1C MFR BLD: 5.6 % OF TOTAL HGB
HDLC SERPL-MCNC: 52 MG/DL
LDLC SERPL CALC-MCNC: 107 MG/DL (CALC)
NONHDLC SERPL-MCNC: 133 MG/DL (CALC)
POTASSIUM SERPL-SCNC: 4.8 MMOL/L (ref 3.5–5.3)
PROT SERPL-MCNC: 6.8 G/DL (ref 6.1–8.1)
PSA SERPL-MCNC: 0.39 NG/ML
SODIUM SERPL-SCNC: 141 MMOL/L (ref 135–146)
TRIGL SERPL-MCNC: 147 MG/DL

## 2022-12-21 ENCOUNTER — OFFICE VISIT (OUTPATIENT)
Dept: FAMILY MEDICINE CLINIC | Facility: CLINIC | Age: 62
End: 2022-12-21

## 2022-12-21 VITALS
WEIGHT: 192 LBS | RESPIRATION RATE: 14 BRPM | HEIGHT: 73 IN | SYSTOLIC BLOOD PRESSURE: 132 MMHG | OXYGEN SATURATION: 98 % | HEART RATE: 84 BPM | BODY MASS INDEX: 25.45 KG/M2 | DIASTOLIC BLOOD PRESSURE: 86 MMHG | TEMPERATURE: 96.2 F

## 2022-12-21 DIAGNOSIS — I47.1 SVT (SUPRAVENTRICULAR TACHYCARDIA) (HCC): ICD-10-CM

## 2022-12-21 DIAGNOSIS — R73.02 IGT (IMPAIRED GLUCOSE TOLERANCE): Primary | ICD-10-CM

## 2022-12-21 DIAGNOSIS — R74.8 ELEVATED CREATINE KINASE LEVEL: ICD-10-CM

## 2022-12-21 DIAGNOSIS — K51.919 ULCERATIVE COLITIS, CHRONIC, UNSPECIFIED COMPLICATION (HCC): ICD-10-CM

## 2022-12-21 DIAGNOSIS — E78.00 PURE HYPERCHOLESTEROLEMIA: Chronic | ICD-10-CM

## 2022-12-21 DIAGNOSIS — I10 PRIMARY HYPERTENSION: Chronic | ICD-10-CM

## 2022-12-21 NOTE — PROGRESS NOTES
FAMILY PRACTICE OFFICE VISIT       NAME: Alee Nieves  AGE: 58 y o  SEX: male       : 1960        MRN: [id number]    DATE: 2022  TIME: 9:01 AM    Assessment and Plan   1  IGT (impaired glucose tolerance)  Comments:  Ted Rice appears well  He is to continue a helathy, lower carb/salt diet, and regular exercise  He is to hydrate well too! Repeat FBW prior to next OV  Orders:  -     Comprehensive metabolic panel; Future; Expected date: 2023  -     HEMOGLOBIN A1C W/ EAG ESTIMATION; Future; Expected date: 2023    2  Primary hypertension  Comments:  Controlled  3  Pure hypercholesterolemia  Comments:  Controlled with Rosuvastatin  Orders:  -     Lipid Panel with Direct LDL reflex; Future; Expected date: 2023    4  Elevated creatine kinase level  Comments:  Pt to hydrate better -> receck labs in 2 weeks  Orders:  -     UA (URINE) with reflex to Scope; Future; Expected date: 2022  -     Basic metabolic panel; Future; Expected date: 2022    5  Ulcerative colitis, chronic, unspecified complication ([facility] )  Comments:  Stable - seeing CR Surgery  Orders:  -     CBC and differential; Future; Expected date: 2023    6  SVT (supraventricular tachycardia) ([facility])  Comments:  No issues recently  There are no Patient Instructions on file for this visit  Chief Complaint     Chief Complaint   Patient presents with   • Follow-up     Patient being seen for 6 month follow up        History of Present Illness   Alee Nieves is a 58y o -year-old male who presents in f/u today  Recent labs - Cr / GFR this time 1  - we discussed at length; pt knows that he need hydrate better  Labs - A1c 5 6%, , PSA 0 39  Continues f/u with CR Surgery  Review of Systems   Review of Systems   Constitutional: Negative for activity change  Respiratory: Negative for shortness of breath  Cardiovascular: Negative for chest pain and palpitations     Gastrointestinal: Negative for abdominal pain, blood in stool and diarrhea  Active Problem List     Patient Active Problem List   Diagnosis   • Allergic rhinitis   • Benign enlargement of prostate   • Crohn's colitis ([place] [address] )   • Edema   • Hyperlipidemia   • Hypertension   • Palpitations   • Prediabetes   • SVT (supraventricular tachycardia) ([facility] )   • Nonmelanoma skin cancer   • Ulcerative colitis, chronic, unspecified complication ([facility] )         Past Medical History:  Past Medical History:   Diagnosis Date   • Allergic    • Joint pain    • Laceration of lower leg     last assessed 06/06/13       Past Surgical History:  History reviewed  No pertinent surgical history      Family History:  Family History   Problem Relation Age of Onset   • No Known Problems Mother        Social History:  Social History     Socioeconomic History   • Marital status: /Civil Union     Spouse name: Not on file   • Number of children: Not on file   • Years of education: Not on file   • Highest education level: Not on file   Occupational History   • Not on file   Tobacco Use   • Smoking status: Never   • Smokeless tobacco: Never   Vaping Use   • Vaping Use: Never used   Substance and Sexual Activity   • Alcohol use: Yes     Comment: occasional   • Drug use: Not Currently   • Sexual activity: Not on file   Other Topics Concern   • Not on file   Social History Narrative   • Not on file     Social Determinants of Health     Financial Resource Strain: Not on file   Food Insecurity: Not on file   Transportation Needs: Not on file   Physical Activity: Not on file   Stress: Not on file   Social Connections: Not on file   Intimate Partner Violence: Not on file   Housing Stability: Not on file       Objective     Vitals:    12/21/22 0901   BP: 132/86   Pulse:    Resp:    Temp:    SpO2:      Wt Readings from Last 3 Encounters:   12/21/22 87 1 kg (192 lb)   06/27/22 83 9 kg (185 lb)   06/21/22 84 4 kg (186 lb)       Physical Exam  Vitals and nursing note reviewed  Constitutional:       General: He is not in acute distress  Appearance: Normal appearance  He is not ill-appearing, toxic-appearing or diaphoretic  HENT:      Head: Normocephalic and atraumatic  Eyes:      General: No scleral icterus  Conjunctiva/sclera: Conjunctivae normal    Cardiovascular:      Rate and Rhythm: Normal rate and regular rhythm  Heart sounds: Normal heart sounds  No murmur heard  No friction rub  No gallop  Pulmonary:      Effort: Pulmonary effort is normal  No respiratory distress  Breath sounds: Normal breath sounds  No stridor  No wheezing, rhonchi or rales  Musculoskeletal:      Cervical back: Normal range of motion and neck supple  No rigidity or tenderness  Lymphadenopathy:      Cervical: No cervical adenopathy  Neurological:      Mental Status: He is alert and oriented to person, place, and time  Psychiatric:         Mood and Affect: Mood normal          Behavior: Behavior normal          Thought Content:  Thought content normal          Judgment: Judgment normal          Pertinent Laboratory/Diagnostic Studies:  Lab Results   Component Value Date    GLUCOSE 94 10/27/2015    BUN 25 12/15/2022    CREATININE 1 44 (H) 12/15/2022    CALCIUM 9 9 12/15/2022     10/27/2015    K 4 8 12/15/2022    CO2 30 12/15/2022     12/15/2022     Lab Results   Component Value Date    ALT 26 12/15/2022    AST 21 12/15/2022    ALKPHOS 64 12/15/2022    BILITOT 0 39 10/27/2015       Lab Results   Component Value Date    WBC 5 7 05/20/2020    HGB 14 7 05/20/2020    HCT 45 2 05/20/2020    MCV 90 4 05/20/2020     05/20/2020       No results found for: TSH    No results found for: CHOL  Lab Results   Component Value Date    TRIG 147 12/15/2022     Lab Results   Component Value Date    HDL 52 12/15/2022     Lab Results   Component Value Date    LDLCALC 107 (H) 12/15/2022     Lab Results   Component Value Date    HGBA1C 5 6 12/15/2022       Results for orders placed or performed in visit on 12/15/22   Lipid Panel with Direct LDL reflex   Result Value Ref Range    Total Cholesterol 185 <200 mg/dL    HDL 52 > OR = 40 mg/dL    Triglycerides 147 <150 mg/dL    LDL Calculated 107 (H) mg/dL (calc)    Chol HDLC Ratio 3 6 <5 0 (calc)    Non-HDL Cholesterol 133 (H) <130 mg/dL (calc)   Comprehensive metabolic panel   Result Value Ref Range    Glucose, Random 113 (H) 65 - 99 mg/dL    BUN 25 7 - 25 mg/dL    Creatinine 1 44 (H) 0 70 - 1 35 mg/dL    eGFR 55 (L) > OR = 60 mL/min/1 73m2    SL AMB BUN/CREATININE RATIO 17 6 - 22 (calc)    Sodium 141 135 - 146 mmol/L    Potassium 4 8 3 5 - 5 3 mmol/L    Chloride 104 98 - 110 mmol/L    CO2 30 20 - 32 mmol/L    Calcium 9 9 8 6 - 10 3 mg/dL    Protein, Total 6 8 6 1 - 8 1 g/dL    Albumin 4 5 3 6 - 5 1 g/dL    Globulin 2 3 1 9 - 3 7 g/dL (calc)    Albumin/Globulin Ratio 2 0 1 0 - 2 5 (calc)    TOTAL BILIRUBIN 0 5 0 2 - 1 2 mg/dL    Alkaline Phosphatase 64 35 - 144 U/L    AST 21 10 - 35 U/L    ALT 26 9 - 46 U/L   PSA, Total Screen   Result Value Ref Range    Prostate Specific Antigen Total 0 39 < OR = 4 00 ng/mL   Hemoglobin A1C With EAG   Result Value Ref Range    Hemoglobin A1C 5 6 <5 7 % of total Hgb    Estimated Average Glucose 114 mg/dL    Estimated Average Glucose (mmol/L) 6 3 mmol/L       Orders Placed This Encounter   Procedures   • UA (URINE) with reflex to Scope   • Basic metabolic panel   • Comprehensive metabolic panel   • HEMOGLOBIN A1C W/ EAG ESTIMATION   • Lipid Panel with Direct LDL reflex   • CBC and differential       ALLERGIES:  Allergies   Allergen Reactions   • Penicillins    • Seasonal Ic [Cholestatin]        Current Medications     Current Outpatient Medications   Medication Sig Dispense Refill   • aspirin 81 MG tablet Take by mouth Take one tab twice weekly      • Calcium Polycarbophil (FIBER-CAPS PO)      • cholecalciferol (VITAMIN D3) 1,000 units tablet Take 1,000 Units by mouth daily     • cyanocobalamin (VITAMIN B-12) 1,000 mcg tablet Take by mouth daily     • Fish Oil-Cholecalciferol (FISH OIL + D3 PO) Take by mouth     • fluticasone (FLONASE) 50 mcg/act nasal spray into each nostril     • Glucosamine Sulfate 500 MG TABS      • losartan (COZAAR) 50 mg tablet TAKE 1 TABLET BY MOUTH EVERY DAY 90 tablet 3   • mesalamine (ASACOL) 800 MG EC tablet TAKE 1 TABLET BY MOUTH THREE TIMES A  tablet 0   • metFORMIN (GLUCOPHAGE) 500 mg tablet TAKE 1 TABLET BY MOUTH TWICE A  tablet 1   • Multiple Vitamins-Iron (QC DAILY MULTIVITAMINS/IRON) TABS Take by mouth     • rosuvastatin (CRESTOR) 5 mg tablet TAKE 1 TABLET BY MOUTH 3 TIMES A WEEK 39 tablet 9     No current facility-administered medications for this visit           Health Maintenance     Health Maintenance   Topic Date Due   • Hepatitis C Screening  Never done   • Pneumococcal Vaccine: Pediatrics (0 to 5 Years) and At-Risk Patients (6 to 59 Years) (1 - PCV) Never done   • HIV Screening  Never done   • COVID-19 Vaccine (4 - Booster for Pfizer series) 02/28/2022   • BMI: Followup Plan  06/15/2022   • Hepatitis B Vaccine (1 of 3 - 3-dose series) 09/17/2024 (Originally 1960)   • DTaP,Tdap,and Td Vaccines (2 - Td or Tdap) 06/06/2023   • Annual Physical  06/21/2023   • Colorectal Cancer Screening  09/28/2023   • Depression Screening  12/21/2023   • BMI: Adult  12/21/2023   • Influenza Vaccine  Completed   • HIB Vaccine  Aged Out   • IPV Vaccine  Aged Out   • Hepatitis A Vaccine  Aged Out   • Meningococcal ACWY Vaccine  Aged Out   • HPV Vaccine  Aged Out     Immunization History   Administered Date(s) Administered   • COVID-19 Moderna Vac BIVALENT 18 Yr+ Im (BOOSTER ONLY) 09/07/2022   • COVID-19 PFIZER VACCINE 0 3 ML IM 03/18/2021, 04/08/2021, 10/30/2021   • INFLUENZA 10/04/2017, 10/03/2018, 09/23/2021, 10/03/2022   • Influenza Injectable, MDCK, Preservative Free, Quadrivalent, 0 5 mL 10/05/2020   • Influenza Quadrivalent, 6-35 Months IM 10/04/2017   • Influenza, injectable, quadrivalent, preservative free 0 5 mL 10/03/2018   • Influenza, recombinant, quadrivalent,injectable, preservative free 09/17/2019   • Influenza, seasonal, injectable 10/01/2015   • Tdap 06/06/2013   • Zoster Vaccine Recombinant 03/16/2022, 05/16/2022          Mango Olivera DO

## 2023-01-11 LAB
APPEARANCE UR: CLEAR
BILIRUB UR QL STRIP: NEGATIVE
BUN SERPL-MCNC: 20 MG/DL (ref 7–25)
BUN/CREAT SERPL: ABNORMAL (CALC) (ref 6–22)
CALCIUM SERPL-MCNC: 10.2 MG/DL (ref 8.6–10.3)
CHLORIDE SERPL-SCNC: 103 MMOL/L (ref 98–110)
CO2 SERPL-SCNC: 33 MMOL/L (ref 20–32)
COLOR UR: YELLOW
CREAT SERPL-MCNC: 1.18 MG/DL (ref 0.7–1.35)
GFR/BSA.PRED SERPLBLD CYS-BASED-ARV: 70 ML/MIN/1.73M2
GLUCOSE SERPL-MCNC: 105 MG/DL (ref 65–99)
GLUCOSE UR QL STRIP: NEGATIVE
HGB UR QL STRIP: NEGATIVE
KETONES UR QL STRIP: NEGATIVE
LEFT EYE DIABETIC RETINOPATHY: NORMAL
LEUKOCYTE ESTERASE UR QL STRIP: NEGATIVE
NITRITE UR QL STRIP: NEGATIVE
PH UR STRIP: NORMAL [PH] (ref 5–8)
POTASSIUM SERPL-SCNC: 5.2 MMOL/L (ref 3.5–5.3)
PROT UR QL STRIP: NEGATIVE
RIGHT EYE DIABETIC RETINOPATHY: NORMAL
SODIUM SERPL-SCNC: 140 MMOL/L (ref 135–146)
SP GR UR STRIP: 1.02 (ref 1–1.03)

## 2023-03-25 DIAGNOSIS — E11.9 TYPE 2 DIABETES MELLITUS WITHOUT COMPLICATION, WITHOUT LONG-TERM CURRENT USE OF INSULIN (HCC): ICD-10-CM

## 2023-06-21 ENCOUNTER — RA CDI HCC (OUTPATIENT)
Dept: OTHER | Facility: HOSPITAL | Age: 63
End: 2023-06-21

## 2023-06-21 NOTE — PROGRESS NOTES
[facility]  coding opportunities       Chart reviewed, no opportunity found: CHART REVIEWED, NO OPPORTUNITY FOUND        Patients Insurance        Commercial Insurance: Demian Mcadams

## 2023-06-23 DIAGNOSIS — E11.9 TYPE 2 DIABETES MELLITUS WITHOUT COMPLICATION, WITHOUT LONG-TERM CURRENT USE OF INSULIN (HCC): ICD-10-CM

## 2023-06-23 LAB
ALBUMIN SERPL-MCNC: 4.3 G/DL (ref 3.6–5.1)
ALBUMIN/GLOB SERPL: 1.8 (CALC) (ref 1–2.5)
ALP SERPL-CCNC: 63 U/L (ref 35–144)
ALT SERPL-CCNC: 26 U/L (ref 9–46)
AST SERPL-CCNC: 23 U/L (ref 10–35)
BASOPHILS # BLD AUTO: 103 CELLS/UL (ref 0–200)
BASOPHILS NFR BLD AUTO: 1.1 %
BILIRUB SERPL-MCNC: 0.6 MG/DL (ref 0.2–1.2)
BUN SERPL-MCNC: 24 MG/DL (ref 7–25)
BUN/CREAT SERPL: ABNORMAL (CALC) (ref 6–22)
CALCIUM SERPL-MCNC: 9.8 MG/DL (ref 8.6–10.3)
CHLORIDE SERPL-SCNC: 103 MMOL/L (ref 98–110)
CHOLEST SERPL-MCNC: 152 MG/DL
CHOLEST/HDLC SERPL: 2.9 (CALC)
CO2 SERPL-SCNC: 30 MMOL/L (ref 20–32)
CREAT SERPL-MCNC: 1.03 MG/DL (ref 0.7–1.35)
EOSINOPHIL # BLD AUTO: 583 CELLS/UL (ref 15–500)
EOSINOPHIL NFR BLD AUTO: 6.2 %
ERYTHROCYTE [DISTWIDTH] IN BLOOD BY AUTOMATED COUNT: 13 % (ref 11–15)
EST. AVERAGE GLUCOSE BLD GHB EST-MCNC: 120 MG/DL
EST. AVERAGE GLUCOSE BLD GHB EST-SCNC: 6.6 MMOL/L
GFR/BSA.PRED SERPLBLD CYS-BASED-ARV: 82 ML/MIN/1.73M2
GLOBULIN SER CALC-MCNC: 2.4 G/DL (CALC) (ref 1.9–3.7)
GLUCOSE SERPL-MCNC: 104 MG/DL (ref 65–99)
HBA1C MFR BLD: 5.8 % OF TOTAL HGB
HCT VFR BLD AUTO: 44.6 % (ref 38.5–50)
HDLC SERPL-MCNC: 53 MG/DL
HGB BLD-MCNC: 14.9 G/DL (ref 13.2–17.1)
LDLC SERPL CALC-MCNC: 77 MG/DL (CALC)
LYMPHOCYTES # BLD AUTO: 1241 CELLS/UL (ref 850–3900)
LYMPHOCYTES NFR BLD AUTO: 13.2 %
MCH RBC QN AUTO: 29.3 PG (ref 27–33)
MCHC RBC AUTO-ENTMCNC: 33.4 G/DL (ref 32–36)
MCV RBC AUTO: 87.6 FL (ref 80–100)
MONOCYTES # BLD AUTO: 743 CELLS/UL (ref 200–950)
MONOCYTES NFR BLD AUTO: 7.9 %
NEUTROPHILS # BLD AUTO: 6730 CELLS/UL (ref 1500–7800)
NEUTROPHILS NFR BLD AUTO: 71.6 %
NONHDLC SERPL-MCNC: 99 MG/DL (CALC)
PLATELET # BLD AUTO: 224 THOUSAND/UL (ref 140–400)
PMV BLD REES-ECKER: 10.9 FL (ref 7.5–12.5)
POTASSIUM SERPL-SCNC: 5 MMOL/L (ref 3.5–5.3)
PROT SERPL-MCNC: 6.7 G/DL (ref 6.1–8.1)
RBC # BLD AUTO: 5.09 MILLION/UL (ref 4.2–5.8)
SODIUM SERPL-SCNC: 140 MMOL/L (ref 135–146)
TRIGL SERPL-MCNC: 137 MG/DL
WBC # BLD AUTO: 9.4 THOUSAND/UL (ref 3.8–10.8)

## 2023-06-28 ENCOUNTER — APPOINTMENT (OUTPATIENT)
Dept: RADIOLOGY | Age: 63
End: 2023-06-28
Payer: COMMERCIAL

## 2023-06-28 ENCOUNTER — OFFICE VISIT (OUTPATIENT)
Dept: FAMILY MEDICINE CLINIC | Facility: CLINIC | Age: 63
End: 2023-06-28
Payer: COMMERCIAL

## 2023-06-28 VITALS
WEIGHT: 199.4 LBS | TEMPERATURE: 96.3 F | DIASTOLIC BLOOD PRESSURE: 84 MMHG | BODY MASS INDEX: 25.59 KG/M2 | HEART RATE: 83 BPM | SYSTOLIC BLOOD PRESSURE: 130 MMHG | HEIGHT: 74 IN | RESPIRATION RATE: 14 BRPM | OXYGEN SATURATION: 97 %

## 2023-06-28 DIAGNOSIS — I47.1 SVT (SUPRAVENTRICULAR TACHYCARDIA) (HCC): Chronic | ICD-10-CM

## 2023-06-28 DIAGNOSIS — R73.02 IGT (IMPAIRED GLUCOSE TOLERANCE): ICD-10-CM

## 2023-06-28 DIAGNOSIS — R53.83 OTHER FATIGUE: ICD-10-CM

## 2023-06-28 DIAGNOSIS — R06.09 DOE (DYSPNEA ON EXERTION): ICD-10-CM

## 2023-06-28 DIAGNOSIS — I10 PRIMARY HYPERTENSION: Chronic | ICD-10-CM

## 2023-06-28 DIAGNOSIS — R06.09 DOE (DYSPNEA ON EXERTION): Primary | ICD-10-CM

## 2023-06-28 DIAGNOSIS — E78.00 PURE HYPERCHOLESTEROLEMIA: Chronic | ICD-10-CM

## 2023-06-28 DIAGNOSIS — K51.919 ULCERATIVE COLITIS, CHRONIC, UNSPECIFIED COMPLICATION (HCC): ICD-10-CM

## 2023-06-28 PROCEDURE — 99214 OFFICE O/P EST MOD 30 MIN: CPT | Performed by: FAMILY MEDICINE

## 2023-06-28 PROCEDURE — 71046 X-RAY EXAM CHEST 2 VIEWS: CPT

## 2023-06-28 PROCEDURE — 93000 ELECTROCARDIOGRAM COMPLETE: CPT | Performed by: FAMILY MEDICINE

## 2023-06-28 NOTE — PROGRESS NOTES
FAMILY PRACTICE OFFICE VISIT       NAME: Ceasar Parra  AGE: 61 y o  SEX: male       : 1960        MRN: [id number]    DATE: 2023  TIME: 12:30 PM    Assessment and Plan   1  GARCIA (dyspnea on exertion)  Comments:  Pt stable -> ?humidity related, deconditioning? ECG fine today  Check stress echo, given hx SVT, IGT, HTN, age, etc   Orders:  -     POCT ECG  -     TSH, 3rd generation with Free T4 reflex; Future  -     Echo stress test, exercise; Future; Expected date: 2023  -     XR chest pa & lateral; Future; Expected date: 2023    2  SVT (supraventricular tachycardia) (HCC)  Comments:  Hx of in past   Recent congestion, likely was allergies-related  Discussed adding OTC Allegra to his Flonase  Allergy season improving right now  Orders:  -     POCT ECG  -     TSH, 3rd generation with Free T4 reflex; Future  -     Echo stress test, exercise; Future; Expected date: 2023    3  Other fatigue  Comments:  Add TSH at this time  Orders:  -     TSH, 3rd generation with Free T4 reflex; Future  -     Echo stress test, exercise; Future; Expected date: 2023    4  Ulcerative colitis, chronic, unspecified complication ([facility] [address] )  Comments:  Stable; working with GI     5  IGT (impaired glucose tolerance)  Comments:  Stable - continue a lower carb/salt diet, and get light activity, walking, etc     6  Primary hypertension  Comments:  Controlled on present management  7  Pure hypercholesterolemia  Comments:  Controlled with Rosuvastatin  There are no Patient Instructions on file for this visit  Chief Complaint     Chief Complaint   Patient presents with   • Physical Exam     Patient being seen for physical        History of Present Illness   Ceasar Parra is a 61y o -year-old male who presents with his wife today  Had nasal congestion, and phlegm at night in last 2 months -> improving lately  But, has also noticed GARCIA while mowing the lawn in recent weeks  No chest pain    No rectal bleeding  Recent labs done - CBC normal, A1c 5 8%, LDL 77, GFR 82  Review of Systems   Review of Systems   Constitutional: Positive for fatigue  Negative for activity change  HENT: Positive for congestion  Had had sinus congestion in the last few months - improved  No snoring  Respiratory: Negative for shortness of breath  Noticing more GARCIA with exertion  Cardiovascular: Negative for chest pain and palpitations  Gastrointestinal: Negative for abdominal pain and blood in stool  Active Problem List     Patient Active Problem List   Diagnosis   • Allergic rhinitis   • Benign enlargement of prostate   • Crohn's colitis ([facility] )   • Edema   • Hyperlipidemia   • Hypertension   • Palpitations   • Prediabetes   • SVT (supraventricular tachycardia) ([facility] )   • Nonmelanoma skin cancer   • Ulcerative colitis, chronic, unspecified complication ([facility] )         Past Medical History:  Past Medical History:   Diagnosis Date   • Allergic    • Joint pain    • Laceration of lower leg     last assessed 06/06/13       Past Surgical History:  History reviewed  No pertinent surgical history      Family History:  Family History   Problem Relation Age of Onset   • No Known Problems Mother        Social History:  Social History     Socioeconomic History   • Marital status: /Civil Union     Spouse name: Not on file   • Number of children: Not on file   • Years of education: Not on file   • Highest education level: Not on file   Occupational History   • Not on file   Tobacco Use   • Smoking status: Never   • Smokeless tobacco: Never   Vaping Use   • Vaping Use: Never used   Substance and Sexual Activity   • Alcohol use: Yes     Comment: occasional   • Drug use: Not Currently   • Sexual activity: Not on file   Other Topics Concern   • Not on file   Social History Narrative   • Not on file     Social Determinants of Health     Financial Resource Strain: Not on file   Food Insecurity: Not on file Transportation Needs: Not on file   Physical Activity: Not on file   Stress: Not on file   Social Connections: Not on file   Intimate Partner Violence: Not on file   Housing Stability: Not on file       Objective     Vitals:    06/28/23 0835   BP: 130/84   Pulse: 83   Resp: 14   Temp: (!) 96 3 °F (35 7 °C)   SpO2: 97%     Wt Readings from Last 3 Encounters:   06/28/23 90 4 kg (199 lb 6 4 oz)   12/21/22 87 1 kg (192 lb)   06/27/22 83 9 kg (185 lb)       Physical Exam  Vitals and nursing note reviewed  Constitutional:       General: He is not in acute distress  Appearance: Normal appearance  He is not ill-appearing, toxic-appearing or diaphoretic  HENT:      Head: Normocephalic and atraumatic  Right Ear: Tympanic membrane, ear canal and external ear normal       Left Ear: Tympanic membrane, ear canal and external ear normal       Mouth/Throat:      Mouth: Mucous membranes are moist       Pharynx: Oropharynx is clear  No oropharyngeal exudate or posterior oropharyngeal erythema  Eyes:      General: No scleral icterus  Conjunctiva/sclera: Conjunctivae normal    Cardiovascular:      Rate and Rhythm: Normal rate and regular rhythm  Heart sounds: Normal heart sounds  No murmur heard  No friction rub  No gallop  Pulmonary:      Effort: Pulmonary effort is normal  No respiratory distress  Breath sounds: Normal breath sounds  No stridor  No wheezing, rhonchi or rales  Abdominal:      General: Abdomen is flat  Bowel sounds are normal  There is no distension  Palpations: Abdomen is soft  There is no mass  Tenderness: There is no abdominal tenderness  There is no guarding or rebound  Musculoskeletal:      Cervical back: Normal range of motion and neck supple  No rigidity or tenderness  Lymphadenopathy:      Cervical: No cervical adenopathy  Neurological:      Mental Status: He is alert and oriented to person, place, and time     Psychiatric:         Mood and Affect: Mood "normal          Behavior: Behavior normal          Thought Content:  Thought content normal          Judgment: Judgment normal          Pertinent Laboratory/Diagnostic Studies:  Lab Results   Component Value Date    GLUCOSE 94 10/27/2015    BUN 24 06/22/2023    CREATININE 1 03 06/22/2023    CALCIUM 9 8 06/22/2023     10/27/2015    K 5 0 06/22/2023    CO2 30 06/22/2023     06/22/2023     Lab Results   Component Value Date    ALT 26 06/22/2023    AST 23 06/22/2023    ALKPHOS 63 06/22/2023    BILITOT 0 39 10/27/2015       Lab Results   Component Value Date    WBC 9 4 06/22/2023    HGB 14 9 06/22/2023    HCT 44 6 06/22/2023    MCV 87 6 06/22/2023     06/22/2023       No results found for: \"TSH\"    No results found for: \"CHOL\"  Lab Results   Component Value Date    TRIG 137 06/22/2023     Lab Results   Component Value Date    HDL 53 06/22/2023     Lab Results   Component Value Date    LDLCALC 77 06/22/2023     Lab Results   Component Value Date    HGBA1C 5 8 (H) 06/22/2023       Results for orders placed or performed in visit on 06/22/23   Lipid Panel with Direct LDL reflex   Result Value Ref Range    Total Cholesterol 152 <200 mg/dL    HDL 53 > OR = 40 mg/dL    Triglycerides 137 <150 mg/dL    LDL Calculated 77 mg/dL (calc)    Chol HDLC Ratio 2 9 <5 0 (calc)    Non-HDL Cholesterol 99 <130 mg/dL (calc)   Comprehensive metabolic panel   Result Value Ref Range    Glucose, Random 104 (H) 65 - 99 mg/dL    BUN 24 7 - 25 mg/dL    Creatinine 1 03 0 70 - 1 35 mg/dL    eGFR 82 > OR = 60 mL/min/1 73m2    SL AMB BUN/CREATININE RATIO NOT APPLICABLE 6 - 22 (calc)    Sodium 140 135 - 146 mmol/L    Potassium 5 0 3 5 - 5 3 mmol/L    Chloride 103 98 - 110 mmol/L    CO2 30 20 - 32 mmol/L    Calcium 9 8 8 6 - 10 3 mg/dL    Protein, Total 6 7 6 1 - 8 1 g/dL    Albumin 4 3 3 6 - 5 1 g/dL    Globulin 2 4 1 9 - 3 7 g/dL (calc)    Albumin/Globulin Ratio 1 8 1 0 - 2 5 (calc)    TOTAL BILIRUBIN 0 6 0 2 - 1 2 mg/dL    Alkaline " Phosphatase 63 35 - 144 U/L    AST 23 10 - 35 U/L    ALT 26 9 - 46 U/L   CBC and differential   Result Value Ref Range    White Blood Cell Count 9 4 3 8 - 10 8 Thousand/uL    Red Blood Cell Count 5 09 4  20 - 5 80 Million/uL    Hemoglobin 14 9 13 2 - 17 1 g/dL    HCT 44 6 38 5 - 50 0 %    MCV 87 6 80 0 - 100 0 fL    MCH 29 3 27 0 - 33 0 pg    MCHC 33 4 32 0 - 36 0 g/dL    RDW 13 0 11 0 - 15 0 %    Platelet Count 533 497 - 400 Thousand/uL    SL AMB MPV 10 9 7 5 - 12 5 fL    Neutrophils (Absolute) 6,730 1,500 - 7,800 cells/uL    Lymphocytes (Absolute) 1,241 850 - 3,900 cells/uL    Monocytes (Absolute) 743 200 - 950 cells/uL    Eosinophils (Absolute) 583 (H) 15 - 500 cells/uL    Basophils  0 - 200 cells/uL    Neutrophils 71 6 %    Lymphocytes 13 2 %    Monocytes 7 9 %    Eosinophils 6 2 %    Basophils PCT 1 1 %   Hemoglobin A1C With EAG   Result Value Ref Range    Hemoglobin A1C 5 8 (H) <5 7 % of total Hgb    Estimated Average Glucose 120 mg/dL    Estimated Average Glucose (mmol/L) 6 6 mmol/L       Orders Placed This Encounter   Procedures   • XR chest pa & lateral   • TSH, 3rd generation with Free T4 reflex   • POCT ECG       ALLERGIES:  Allergies   Allergen Reactions   • Penicillins    • Seasonal Ic [Cholestatin]        Current Medications     Current Outpatient Medications   Medication Sig Dispense Refill   • aspirin 81 MG tablet Take by mouth Take one tab twice weekly      • Calcium Polycarbophil (FIBER-CAPS PO)      • cholecalciferol (VITAMIN D3) 1,000 units tablet Take 1,000 Units by mouth daily     • cyanocobalamin (VITAMIN B-12) 1,000 mcg tablet Take by mouth daily     • Fish Oil-Cholecalciferol (FISH OIL + D3 PO) Take by mouth     • fluticasone (FLONASE) 50 mcg/act nasal spray into each nostril     • Glucosamine Sulfate 500 MG TABS      • losartan (COZAAR) 50 mg tablet TAKE 1 TABLET BY MOUTH EVERY DAY 90 tablet 3   • mesalamine (ASACOL) 800 MG EC tablet Take 1 tablet (800 mg total) by mouth 3 (three) times a day 270 tablet 0   • metFORMIN (GLUCOPHAGE) 500 mg tablet TAKE 1 TABLET BY MOUTH TWICE A  tablet 3   • Multiple Vitamins-Iron (QC DAILY MULTIVITAMINS/IRON) TABS Take by mouth     • rosuvastatin (CRESTOR) 5 mg tablet TAKE 1 TABLET BY MOUTH 3 TIMES A WEEK 39 tablet 9     No current facility-administered medications for this visit           Health Maintenance     Health Maintenance   Topic Date Due   • Hepatitis C Screening  Never done   • Pneumococcal Vaccine: Pediatrics (0 to 5 Years) and At-Risk Patients (6 to 59 Years) (1 - PCV) Never done   • HIV Screening  Never done   • BMI: Followup Plan  06/15/2022   • DTaP,Tdap,and Td Vaccines (2 - Td or Tdap) 06/06/2023   • Annual Physical  06/21/2023   • Colorectal Cancer Screening  09/28/2023   • Depression Screening  06/28/2024   • BMI: Adult  06/28/2024   • Influenza Vaccine  Completed   • COVID-19 Vaccine  Completed   • HIB Vaccine  Aged Out   • IPV Vaccine  Aged Out   • Hepatitis A Vaccine  Aged Out   • Meningococcal ACWY Vaccine  Aged Out   • HPV Vaccine  Aged Out     Immunization History   Administered Date(s) Administered   • COVID-19 Moderna Vac BIVALENT 12 Yr+ IM (BOOSTER ONLY) 0 5 ML 09/07/2022   • COVID-19 PFIZER VACCINE 0 3 ML IM 03/18/2021, 04/08/2021, 10/30/2021   • INFLUENZA 10/04/2017, 10/03/2018, 09/23/2021, 10/03/2022   • Influenza Injectable, MDCK, Preservative Free, Quadrivalent, 0 5 mL 10/05/2020   • Influenza Quadrivalent, 6-35 Months IM 10/04/2017   • Influenza, injectable, quadrivalent, preservative free 0 5 mL 10/03/2018   • Influenza, recombinant, quadrivalent,injectable, preservative free 09/17/2019   • Influenza, seasonal, injectable 10/01/2015   • Tdap 06/06/2013   • Zoster Vaccine Recombinant 03/16/2022, 05/16/2022          Mango Olivera DO

## 2023-06-30 LAB — TSH SERPL-ACNC: 2.98 MIU/L (ref 0.4–4.5)

## 2023-07-10 DIAGNOSIS — I47.1 SVT (SUPRAVENTRICULAR TACHYCARDIA) (HCC): ICD-10-CM

## 2023-07-10 DIAGNOSIS — R53.83 OTHER FATIGUE: ICD-10-CM

## 2023-07-10 DIAGNOSIS — R06.09 DOE (DYSPNEA ON EXERTION): Primary | ICD-10-CM

## 2023-08-01 ENCOUNTER — HOSPITAL ENCOUNTER (OUTPATIENT)
Dept: NON INVASIVE DIAGNOSTICS | Facility: CLINIC | Age: 63
Discharge: HOME | End: 2023-08-01
Payer: COMMERCIAL

## 2023-08-01 VITALS — WEIGHT: 199 LBS | HEIGHT: 73 IN | BODY MASS INDEX: 26.37 KG/M2

## 2023-08-01 VITALS
WEIGHT: 199 LBS | HEART RATE: 83 BPM | DIASTOLIC BLOOD PRESSURE: 84 MMHG | SYSTOLIC BLOOD PRESSURE: 130 MMHG | HEIGHT: 73 IN | BODY MASS INDEX: 26.37 KG/M2

## 2023-08-01 DIAGNOSIS — R53.83 OTHER FATIGUE: ICD-10-CM

## 2023-08-01 DIAGNOSIS — I47.1 SVT (SUPRAVENTRICULAR TACHYCARDIA) (HCC): ICD-10-CM

## 2023-08-01 DIAGNOSIS — R06.09 DOE (DYSPNEA ON EXERTION): ICD-10-CM

## 2023-08-01 DIAGNOSIS — R06.09 DYSPNEA ON EXERTION: Primary | ICD-10-CM

## 2023-08-01 DIAGNOSIS — R94.39 ABNORMAL STRESS TEST: ICD-10-CM

## 2023-08-01 LAB
AORTIC ROOT: 4 CM
AORTIC VALVE ANNULUS: 2.6 CM
APICAL FOUR CHAMBER EJECTION FRACTION: 64 %
ASCENDING AORTA: 3.6 CM
E WAVE DECELERATION TIME: 177 MS
FRACTIONAL SHORTENING: 35 (ref 28–44)
INTERVENTRICULAR SEPTUM IN DIASTOLE (PARASTERNAL SHORT AXIS VIEW): 1.2 CM
INTERVENTRICULAR SEPTUM: 1.2 CM (ref 0.6–1.1)
LAAS-AP2: 17.9 CM2
LAAS-AP4: 13.2 CM2
LEFT ATRIUM SIZE: 3.5 CM
LEFT ATRIUM VOLUME (MOD BIPLANE): 44 ML
LEFT INTERNAL DIMENSION IN SYSTOLE: 3 CM (ref 2.1–4)
LEFT VENTRICULAR INTERNAL DIMENSION IN DIASTOLE: 4.6 CM (ref 3.5–6)
LEFT VENTRICULAR POSTERIOR WALL IN END DIASTOLE: 1.1 CM
LEFT VENTRICULAR STROKE VOLUME: 62 ML
LVSV (TEICH): 62 ML
MAX HR PERCENT: 97 %
MAX HR: 153 BPM
MV E'TISSUE VEL-SEP: 9 CM/S
MV PEAK A VEL: 0.79 M/S
MV PEAK E VEL: 92 CM/S
MV STENOSIS PRESSURE HALF TIME: 51 MS
MV VALVE AREA P 1/2 METHOD: 4.31
RA PRESSURE ESTIMATED: 5 MMHG
RATE PRESSURE PRODUCT: NORMAL
RIGHT ATRIUM AREA SYSTOLE A4C: 11.8 CM2
RIGHT VENTRICLE ID DIMENSION: 3.2 CM
SINOTUBULAR JUNCTION: 3.1 CM
SL CV LEFT ATRIUM LENGTH A2C: 5 CM
SL CV LV EF: 65
SL CV PED ECHO LEFT VENTRICLE DIASTOLIC VOLUME (MOD BIPLANE) 2D: 96 ML
SL CV PED ECHO LEFT VENTRICLE SYSTOLIC VOLUME (MOD BIPLANE) 2D: 34 ML
SL CV SINUS OF VALSALVA 2D: 3.7 CM
SL CV STRESS RECOVERY BP: NORMAL MMHG
SL CV STRESS RECOVERY HR: 93 BPM
SL CV STRESS RECOVERY O2 SAT: 99 %
SL CV STRESS STAGE REACHED: 4
STJ: 3.1 CM
STRESS ANGINA INDEX: 0
STRESS BASELINE BP: NORMAL MMHG
STRESS BASELINE HR: 75 BPM
STRESS DUKE TREADMILL SCORE: 5
STRESS O2 SAT REST: 97 %
STRESS PEAK HR: 151 BPM
STRESS POST ESTIMATED WORKLOAD: 11 METS
STRESS POST EXERCISE DUR MIN: 9 MIN
STRESS POST EXERCISE DUR SEC: 36 SEC
STRESS POST O2 SAT PEAK: 99 %
STRESS POST PEAK BP: 226 MMHG
STRESS ST DEPRESSION: 1 MM
TRICUSPID ANNULAR PLANE SYSTOLIC EXCURSION: 2.2 CM

## 2023-08-01 PROCEDURE — 93306 TTE W/DOPPLER COMPLETE: CPT | Performed by: INTERNAL MEDICINE

## 2023-08-01 PROCEDURE — 93018 CV STRESS TEST I&R ONLY: CPT | Performed by: INTERNAL MEDICINE

## 2023-08-01 PROCEDURE — 93016 CV STRESS TEST SUPVJ ONLY: CPT | Performed by: INTERNAL MEDICINE

## 2023-08-01 PROCEDURE — 93306 TTE W/DOPPLER COMPLETE: CPT

## 2023-08-01 PROCEDURE — 93017 CV STRESS TEST TRACING ONLY: CPT

## 2023-08-02 ENCOUNTER — TELEPHONE (OUTPATIENT)
Dept: FAMILY MEDICINE CLINIC | Facility: CLINIC | Age: 63
End: 2023-08-02

## 2023-08-02 LAB
CHEST PAIN STATEMENT: NORMAL
MAX DIASTOLIC BP: 80 MMHG
MAX HEART RATE: 153 BPM
MAX PREDICTED HEART RATE: 157 BPM
MAX. SYSTOLIC BP: 226 MMHG
PROTOCOL NAME: NORMAL
REASON FOR TERMINATION: NORMAL
TARGET HR FORMULA: NORMAL
TEST INDICATION: NORMAL
TIME IN EXERCISE PHASE: NORMAL

## 2023-08-02 NOTE — TELEPHONE ENCOUNTER
Patient called and stated that he hasn't heard from the Cardiologist patient stated " he was to call our office back if the cardiologist hasn't called him" Please advise if our office is to call cardiology for patient of if patient Is to call them.

## 2023-08-03 NOTE — TELEPHONE ENCOUNTER
Called Cardiology and was able to get the first available appointment fir 9/22 at 9am and put the patient on the wait list. Patient was call and the information was given. He was advised to answer the phone when they call in case there is a cancellation.

## 2023-08-07 ENCOUNTER — OFFICE VISIT (OUTPATIENT)
Dept: FAMILY MEDICINE CLINIC | Facility: CLINIC | Age: 63
End: 2023-08-07
Payer: COMMERCIAL

## 2023-08-07 VITALS
BODY MASS INDEX: 24.77 KG/M2 | HEIGHT: 74 IN | DIASTOLIC BLOOD PRESSURE: 86 MMHG | HEART RATE: 78 BPM | RESPIRATION RATE: 16 BRPM | WEIGHT: 193 LBS | OXYGEN SATURATION: 98 % | SYSTOLIC BLOOD PRESSURE: 130 MMHG | TEMPERATURE: 97.3 F

## 2023-08-07 DIAGNOSIS — E78.00 PURE HYPERCHOLESTEROLEMIA: Chronic | ICD-10-CM

## 2023-08-07 DIAGNOSIS — Z12.5 SCREENING FOR PROSTATE CANCER: ICD-10-CM

## 2023-08-07 DIAGNOSIS — R73.02 IGT (IMPAIRED GLUCOSE TOLERANCE): ICD-10-CM

## 2023-08-07 DIAGNOSIS — I10 PRIMARY HYPERTENSION: Chronic | ICD-10-CM

## 2023-08-07 DIAGNOSIS — R06.09 DOE (DYSPNEA ON EXERTION): Primary | ICD-10-CM

## 2023-08-07 DIAGNOSIS — R94.39 ABNORMAL STRESS TEST: ICD-10-CM

## 2023-08-07 DIAGNOSIS — I77.810 DILATED AORTIC ROOT (HCC): ICD-10-CM

## 2023-08-07 DIAGNOSIS — E55.9 VITAMIN D DEFICIENCY: ICD-10-CM

## 2023-08-07 PROCEDURE — 99214 OFFICE O/P EST MOD 30 MIN: CPT | Performed by: FAMILY MEDICINE

## 2023-08-07 RX ORDER — ROSUVASTATIN CALCIUM 5 MG/1
5 TABLET, COATED ORAL 3 TIMES WEEKLY
Qty: 90 TABLET | Refills: 1 | Status: SHIPPED | OUTPATIENT
Start: 2023-08-07 | End: 2023-08-11 | Stop reason: SDUPTHER

## 2023-08-07 NOTE — PROGRESS NOTES
FAMILY PRACTICE OFFICE VISIT       NAME: Musa Tompkins  AGE: 61 y.o. SEX: male       : 1960        MRN: [id number]    DATE: 2023  TIME: 12:36 PM    Assessment and Plan   1. GARCIA (dyspnea on exertion)  Comments:  Shantell Carrera is stable on exam - consult with Cardiology pending. Disc modification of activities at length. Strict precautions given to the pt. Orders:  -     Echo complete w/ contrast if indicated; Future; Expected date: 2024    2. Abnormal stress test  Comments:  As above. Pt to take his Statin daily and 81mg ASA daily. Orders:  -     Echo complete w/ contrast if indicated; Future; Expected date: 2024    3. Dilated aortic root (HCC)  Comments:  Discussed -> repeat echo in 1 year. Orders:  -     Echo complete w/ contrast if indicated; Future; Expected date: 2024    4. Primary hypertension  Comments:  Stable on present management. 5. IGT (impaired glucose tolerance)  Comments:  Hx of; stable. Orders:  -     HEMOGLOBIN A1C W/ EAG ESTIMATION; Future  -     Comprehensive metabolic panel; Future  -     rosuvastatin (CRESTOR) 5 mg tablet; Take 1 tablet (5 mg total) by mouth 3 (three) times a week    6. Pure hypercholesterolemia  Comments:  Pt to take Statin daily. Orders:  -     Lipid Panel with Direct LDL reflex; Future  -     rosuvastatin (CRESTOR) 5 mg tablet; Take 1 tablet (5 mg total) by mouth 3 (three) times a week    7. Screening for prostate cancer  -     PSA, Total Screen; Future; Expected date: 2023    8. Vitamin D deficiency  Comments:  hx of. Orders:  -     Vitamin D 25 hydroxy; Future         There are no Patient Instructions on file for this visit. Chief Complaint     Chief Complaint   Patient presents with   • Follow-up     Patient being seen to review test results        History of Present Illness   Musa Tompkins is a 61y.o.-year-old male who presents in f/u with his wife today. Shantell Carrera already has a pending appt with Cardiology next month.   Discussed his EST / echo results at length, modifications of activity, etc.      Review of Systems   Review of Systems   Constitutional: Negative for activity change. Respiratory: Negative for shortness of breath. Cardiovascular: Negative for chest pain. Gastrointestinal: Negative for abdominal pain and blood in stool. Musculoskeletal: Negative for myalgias. Active Problem List     Patient Active Problem List   Diagnosis   • Allergic rhinitis   • Benign enlargement of prostate   • Crohn's colitis ([address])   • Edema   • Hyperlipidemia   • Hypertension   • Palpitations   • Prediabetes   • SVT (supraventricular tachycardia) ([address])   • Nonmelanoma skin cancer   • Ulcerative colitis, chronic, unspecified complication ([address])         Past Medical History:  Past Medical History:   Diagnosis Date   • Allergic    • Joint pain    • Laceration of lower leg     last assessed 06/06/13       Past Surgical History:  History reviewed. No pertinent surgical history. Family History:  Family History   Problem Relation Age of Onset   • No Known Problems Mother        Social History:  Social History     Socioeconomic History   • Marital status: /Civil Union     Spouse name: Not on file   • Number of children: Not on file   • Years of education: Not on file   • Highest education level: Not on file   Occupational History   • Not on file   Tobacco Use   • Smoking status: Never   • Smokeless tobacco: Never   Vaping Use   • Vaping Use: Never used   Substance and Sexual Activity   • Alcohol use:  Yes     Alcohol/week: 2.0 standard drinks of alcohol     Types: 2 Cans of beer per week     Comment: occasional   • Drug use: Never   • Sexual activity: Not on file   Other Topics Concern   • Not on file   Social History Narrative   • Not on file     Social Determinants of Health     Financial Resource Strain: Not on file   Food Insecurity: Not on file   Transportation Needs: Not on file   Physical Activity: Not on file   Stress: Not on file Social Connections: Not on file   Intimate Partner Violence: Not on file   Housing Stability: Not on file       Objective     Vitals:    08/07/23 1127   BP: 130/86   Pulse: 78   Resp: 16   Temp: (!) 97.3 °F (36.3 °C)   SpO2: 98%     Wt Readings from Last 3 Encounters:   08/07/23 87.5 kg (193 lb)   08/01/23 90.3 kg (199 lb)   08/01/23 90.3 kg (199 lb)       Physical Exam  Vitals and nursing note reviewed. Constitutional:       General: He is not in acute distress. Appearance: Normal appearance. He is not ill-appearing, toxic-appearing or diaphoretic. HENT:      Head: Normocephalic and atraumatic. Eyes:      General: No scleral icterus. Conjunctiva/sclera: Conjunctivae normal.   Cardiovascular:      Rate and Rhythm: Normal rate and regular rhythm. Heart sounds: Normal heart sounds. No murmur heard. No friction rub. No gallop. Pulmonary:      Effort: Pulmonary effort is normal. No respiratory distress. Breath sounds: Normal breath sounds. No stridor. No wheezing, rhonchi or rales. Musculoskeletal:      Cervical back: Normal range of motion and neck supple. No rigidity or tenderness. Lymphadenopathy:      Cervical: No cervical adenopathy. Neurological:      Mental Status: He is alert and oriented to person, place, and time. Psychiatric:         Mood and Affect: Mood normal.         Behavior: Behavior normal.         Thought Content:  Thought content normal.         Judgment: Judgment normal.         Pertinent Laboratory/Diagnostic Studies:  Lab Results   Component Value Date    GLUCOSE 94 10/27/2015    BUN 24 06/22/2023    CREATININE 1.03 06/22/2023    CALCIUM 9.8 06/22/2023     10/27/2015    K 5.0 06/22/2023    CO2 30 06/22/2023     06/22/2023     Lab Results   Component Value Date    ALT 26 06/22/2023    AST 23 06/22/2023    ALKPHOS 63 06/22/2023    BILITOT 0.39 10/27/2015       Lab Results   Component Value Date    WBC 9.4 06/22/2023    HGB 14.9 06/22/2023    HCT 44.6 06/22/2023    MCV 87.6 06/22/2023     06/22/2023       No results found for: "TSH"    No results found for: "CHOL"  Lab Results   Component Value Date    TRIG 137 06/22/2023     Lab Results   Component Value Date    HDL 53 06/22/2023     Lab Results   Component Value Date    LDLCALC 77 06/22/2023     Lab Results   Component Value Date    HGBA1C 5.8 (H) 06/22/2023       Results for orders placed or performed during the hospital encounter of 08/01/23   Stress test only, exercise   Result Value Ref Range    Baseline HR 75 bpm    Baseline /88 mmHg    O2 sat rest 97 %    Stress peak  bpm    Post peak  mmHg    Rate Pressure Product 34,126.0     O2 sat peak 99 %    Recovery HR 93 bpm    Recovery /84 mmHg    O2 sat recovery 99 %    Max  bpm    Max HR Percent 97 %    Exercise duration (min) 9 min    Exercise duration (sec) 36 sec    Estimated workload 11.0 METS    Angina Index 0     Stress Stage Reached 4.0     [facility] Treadmill Score 5     ST Depression (mm) 1.0 mm   Stress strip   Result Value Ref Range    Protocol Name UZIEL     Time In Exercise Phase 00:09:36     MAX.  SYSTOLIC  mmHg    Max Diastolic Bp 80 mmHg    Max Heart Rate 153 BPM    Max Predicted Heart Rate 157 BPM    Reason for Termination leg muscle fatigue     Test Indication Dyspnea on exertion     Target Hr Formular (220 - Age)*100%     Arrhy During Ex      ECG Interp Before Ex      ECG Interp during Ex      Ex Summary Comment      Chest Pain Statement none     Overall Hr Response To Exercise      Overall BP Response To Exercise         Orders Placed This Encounter   Procedures   • HEMOGLOBIN A1C W/ EAG ESTIMATION   • Comprehensive metabolic panel   • Lipid Panel with Direct LDL reflex   • PSA, Total Screen   • Vitamin D 25 hydroxy   • Echo complete w/ contrast if indicated       ALLERGIES:  Allergies   Allergen Reactions   • Penicillins    • Seasonal Ic [Cholestatin]        Current Medications     Current Outpatient Medications   Medication Sig Dispense Refill   • aspirin 81 MG tablet Take by mouth Take one tab twice weekly      • Calcium Polycarbophil (FIBER-CAPS PO)      • cholecalciferol (VITAMIN D3) 1,000 units tablet Take 1,000 Units by mouth daily     • cyanocobalamin (VITAMIN B-12) 1,000 mcg tablet Take by mouth daily     • Fish Oil-Cholecalciferol (FISH OIL + D3 PO) Take by mouth     • fluticasone (FLONASE) 50 mcg/act nasal spray into each nostril     • Glucosamine Sulfate 500 MG TABS      • losartan (COZAAR) 50 mg tablet TAKE 1 TABLET BY MOUTH EVERY DAY 90 tablet 3   • mesalamine (ASACOL) 800 MG EC tablet Take 1 tablet (800 mg total) by mouth 3 (three) times a day 270 tablet 0   • metFORMIN (GLUCOPHAGE) 500 mg tablet TAKE 1 TABLET BY MOUTH TWICE A  tablet 3   • Multiple Vitamins-Iron (QC DAILY MULTIVITAMINS/IRON) TABS Take by mouth     • rosuvastatin (CRESTOR) 5 mg tablet Take 1 tablet (5 mg total) by mouth 3 (three) times a week 90 tablet 1     No current facility-administered medications for this visit.          Health Maintenance     Health Maintenance   Topic Date Due   • Hepatitis C Screening  Never done   • Pneumococcal Vaccine: Pediatrics (0 to 5 Years) and At-Risk Patients (6 to 59 Years) (1 - PCV) Never done   • HIV Screening  Never done   • DTaP,Tdap,and Td Vaccines (2 - Td or Tdap) 06/06/2023   • Annual Physical  06/21/2023   • Influenza Vaccine (1) 09/01/2023   • Colorectal Cancer Screening  09/28/2023   • Depression Screening  06/28/2024   • BMI: Adult  08/07/2024   • COVID-19 Vaccine  Completed   • HIB Vaccine  Aged Out   • IPV Vaccine  Aged Out   • Hepatitis A Vaccine  Aged Out   • Meningococcal ACWY Vaccine  Aged Out   • HPV Vaccine  Aged Out     Immunization History   Administered Date(s) Administered   • COVID-19 Moderna Vac BIVALENT 12 Yr+ IM (BOOSTER ONLY) 0.5 ML 09/07/2022   • COVID-19 PFIZER VACCINE 0.3 ML IM 03/18/2021, 04/08/2021, 10/30/2021   • INFLUENZA 10/04/2017, 10/03/2018, 09/23/2021, 10/03/2022   • Influenza Injectable, MDCK, Preservative Free, Quadrivalent, 0.5 mL 10/05/2020   • Influenza Quadrivalent, 6-35 Months IM 10/04/2017   • Influenza, injectable, quadrivalent, preservative free 0.5 mL 10/03/2018   • Influenza, recombinant, quadrivalent,injectable, preservative free 09/17/2019   • Influenza, seasonal, injectable 10/01/2015   • Tdap 06/06/2013   • Zoster Vaccine Recombinant 03/16/2022, 05/16/2022          Mango Olivera DO

## 2023-08-10 DIAGNOSIS — E11.9 TYPE 2 DIABETES MELLITUS WITHOUT COMPLICATION, WITHOUT LONG-TERM CURRENT USE OF INSULIN (HCC): ICD-10-CM

## 2023-08-11 DIAGNOSIS — E78.00 PURE HYPERCHOLESTEROLEMIA: Chronic | ICD-10-CM

## 2023-08-11 DIAGNOSIS — R73.02 IGT (IMPAIRED GLUCOSE TOLERANCE): ICD-10-CM

## 2023-08-11 RX ORDER — ROSUVASTATIN CALCIUM 5 MG/1
5 TABLET, COATED ORAL DAILY
Qty: 90 TABLET | Refills: 1 | Status: SHIPPED | OUTPATIENT
Start: 2023-08-11

## 2023-09-19 ENCOUNTER — OFFICE VISIT (OUTPATIENT)
Dept: CARDIOLOGY CLINIC | Facility: CLINIC | Age: 63
End: 2023-09-19
Payer: COMMERCIAL

## 2023-09-19 VITALS
BODY MASS INDEX: 26.28 KG/M2 | HEART RATE: 89 BPM | HEIGHT: 73 IN | OXYGEN SATURATION: 97 % | WEIGHT: 198.3 LBS | SYSTOLIC BLOOD PRESSURE: 130 MMHG | DIASTOLIC BLOOD PRESSURE: 78 MMHG

## 2023-09-19 DIAGNOSIS — R94.39 ABNORMAL STRESS TEST: Primary | ICD-10-CM

## 2023-09-19 DIAGNOSIS — I10 PRIMARY HYPERTENSION: ICD-10-CM

## 2023-09-19 DIAGNOSIS — E78.5 HYPERLIPIDEMIA, UNSPECIFIED HYPERLIPIDEMIA TYPE: ICD-10-CM

## 2023-09-19 DIAGNOSIS — R73.03 PREDIABETES: Chronic | ICD-10-CM

## 2023-09-19 PROCEDURE — 99243 OFF/OP CNSLTJ NEW/EST LOW 30: CPT | Performed by: NURSE PRACTITIONER

## 2023-09-19 NOTE — PROGRESS NOTES
Cardiology Consultation     Dipti Min  [id number]  1960  HEART & VASCULAR [facility] [place]  [address] [facility] [address]    1. Abnormal stress test  Ambulatory Referral to Cardiology      2. Primary hypertension  Ambulatory Referral to Cardiology    Echo stress test, exercise      3. Hyperlipidemia, unspecified hyperlipidemia type  Echo stress test, exercise      4. Prediabetes          Mr Dipti Min presents to our office for cardiology  Consultation for abnormal stress test. He is accompanied by his wife. Malia Hurtado admits to dyspnea with exertion with mowing the grass. He was finding it harder to breath and catch his breath in the heat with mowing the grass. Malia Hurtado denies symptoms of CP, palpitations, lightheadedness or dizziness. His PCP ordered an exercise stress test which showed Horizontal ST depression 1.0mm in inferior leads II, III and aVF. ST depression during stress and ended in recovery. It was recommended he undergo repeat stress test imaging with a stress echocardiogram/nuclear stress test.  I have reviewed the stress test and provided education. Denies Family hx of CAD or sudden cardiac death  Denies Tobacco use  Admits to drinking a few beer a week. DM2 HgbA1C 5.8 on 6/22/23 6/22/23 , , HDL 53, LDL 77   8/01/23 TTE: LVEF 65%, Mild MR, aortic root mildly dilated 4.0cm and ascending aorta 3.6cm.      Patient Active Problem List   Diagnosis   • Allergic rhinitis   • Benign enlargement of prostate   • Crohn's colitis ([address])   • Edema   • Hyperlipidemia   • Hypertension   • Palpitations   • Prediabetes   • SVT (supraventricular tachycardia) (HCC)   • Nonmelanoma skin cancer   • Ulcerative colitis, chronic, unspecified complication ([address])     Past Medical History:   Diagnosis Date   • Allergic    • Joint pain    • Laceration of lower leg     last assessed 06/06/13     Social History Socioeconomic History   • Marital status: /Civil Union     Spouse name: Not on file   • Number of children: Not on file   • Years of education: Not on file   • Highest education level: Not on file   Occupational History   • Not on file   Tobacco Use   • Smoking status: Never   • Smokeless tobacco: Never   Vaping Use   • Vaping Use: Never used   Substance and Sexual Activity   • Alcohol use: Yes     Alcohol/week: 2.0 standard drinks of alcohol     Types: 2 Cans of beer per week     Comment: occasional   • Drug use: Never   • Sexual activity: Not on file   Other Topics Concern   • Not on file   Social History Narrative   • Not on file     Social Determinants of Health     Financial Resource Strain: Not on file   Food Insecurity: Not on file   Transportation Needs: Not on file   Physical Activity: Not on file   Stress: Not on file   Social Connections: Not on file   Intimate Partner Violence: Not on file   Housing Stability: Not on file      Family History   Problem Relation Age of Onset   • No Known Problems Mother      No past surgical history on file.     Current Outpatient Medications:   •  aspirin 81 MG tablet, Take 81 mg by mouth daily, Disp: , Rfl:   •  Calcium Polycarbophil (FIBER-CAPS PO), , Disp: , Rfl:   •  cholecalciferol (VITAMIN D3) 1,000 units tablet, Take 1,000 Units by mouth daily, Disp: , Rfl:   •  cyanocobalamin (VITAMIN B-12) 1,000 mcg tablet, Take by mouth daily, Disp: , Rfl:   •  Fish Oil-Cholecalciferol (FISH OIL + D3 PO), Take by mouth, Disp: , Rfl:   •  fluticasone (FLONASE) 50 mcg/act nasal spray, into each nostril, Disp: , Rfl:   •  Glucosamine Sulfate 500 MG TABS, , Disp: , Rfl:   •  losartan (COZAAR) 50 mg tablet, TAKE 1 TABLET BY MOUTH EVERY DAY, Disp: 90 tablet, Rfl: 3  •  mesalamine (ASACOL) 800 MG EC tablet, Take 1 tablet (800 mg total) by mouth 3 (three) times a day, Disp: 270 tablet, Rfl: 0  •  metFORMIN (GLUCOPHAGE) 500 mg tablet, Take 1 tablet (500 mg total) by mouth 2 (two) times a day, Disp: 180 tablet, Rfl: 0  •  Multiple Vitamins-Iron (QC DAILY MULTIVITAMINS/IRON) TABS, Take by mouth, Disp: , Rfl:   •  rosuvastatin (CRESTOR) 5 mg tablet, Take 1 tablet (5 mg total) by mouth daily, Disp: 90 tablet, Rfl: 1  Allergies   Allergen Reactions   • Penicillins    • Seasonal Ic [Cholestatin]      Vitals:    09/19/23 0753   BP: 130/78   BP Location: Left arm   Patient Position: Sitting   Cuff Size: Standard   Pulse: 89   SpO2: 97%   Weight: 89.9 kg (198 lb 4.8 oz)   Height: 6' 1" (1.854 m)       Labs:  Hospital Outpatient Visit on 08/01/2023   Component Date Value   • Baseline HR 08/01/2023 75    • Baseline BP 08/01/2023 146/88    • O2 sat rest 08/01/2023 97    • Stress peak HR 08/01/2023 151    • Post peak BP 08/01/2023 226    • Rate Pressure Product 08/01/2023 34,126.0    • O2 sat peak 08/01/2023 99    • Recovery HR 08/01/2023 93    • Recovery BP 08/01/2023 152/84    • O2 sat recovery 08/01/2023 99    • Max HR 08/01/2023 153    • Max HR Percent 08/01/2023 97    • Exercise duration (min) 08/01/2023 9    • Exercise duration (sec) 08/01/2023 36    • Estimated workload 08/01/2023 11.0    • Angina Index 08/01/2023 0    • Stress Stage Reached 08/01/2023 4.0    • Hinojosa Treadmill Score 08/01/2023 5    • ST Depression (mm) 08/01/2023 1.0    • Protocol Name 08/01/2023 UZIEL    • Time In Exercise Phase 08/01/2023 00:09:36    • MAX.  SYSTOLIC BP 98/61/9577 311    • Max Diastolic Bp 70/78/7837 80    • Max Heart Rate 08/01/2023 153    • Max Predicted Heart Rate 08/01/2023 157    • Reason for Termination 08/01/2023 leg muscle fatigue    • Test Indication 08/01/2023 Dyspnea on exertion    • Target Hr Formular 08/01/2023 (220 - Age)*100%    • Chest Pain Statement 08/01/2023 none    Hospital Outpatient Visit on 08/01/2023   Component Date Value   • A4C EF 08/01/2023 64    • LVIDd 08/01/2023 4.60    • LVIDS 08/01/2023 3.00    • IVSd 08/01/2023 1.20    • LVPWd 08/01/2023 1.10    • FS 08/01/2023 35    • MV E' Tissue Velocity Se* 08/01/2023 9    • E wave deceleration time 08/01/2023 177    • MV Peak E Lopez 08/01/2023 92    • MV Peak A Lopez 08/01/2023 0.79    • RVID d 08/01/2023 3.2    • Tricuspid annular plane * 08/01/2023 2.20    • LA size 08/01/2023 3.5    • LA length (A2C) 08/01/2023 5.00    • LA volume (BP) 08/01/2023 44    • RAA A4C 08/01/2023 11.8    • MV stenosis pressure 1/2* 08/01/2023 51    • MV valve area p 1/2 meth* 08/01/2023 4.31    • Ao root 08/01/2023 4.00    • STJ 08/01/2023 3.1    • Asc Ao 08/01/2023 3.6    • Sinus of Valsalva, 2D 08/01/2023 3.7    • Left ventricular stroke * 08/01/2023 62.00    • Ao STJ 08/01/2023 3.10    • Ao annulus 08/01/2023 2.60    • IVS 08/01/2023 1.2    • LEFT VENTRICLE SYSTOLIC * 15/73/4248 34    • LV DIASTOLIC VOLUME (MOD* 50/98/6251 96    • Left Atrium Area-systoli* 08/01/2023 13.2    • Left Atrium Area-systoli* 08/01/2023 17.9    • LVSV, 2D 08/01/2023 62    • LV EF 08/01/2023 65    • Est. RA pres 08/01/2023 5.0      Imaging: No results found. Review of Systems:  Shortness of breath with exertion, all other ROS negative    Physical Exam:  AO x 3  CV S1S2 RRR no murmur  Lung sounds clear   No lower extremity edema   Neuro no weakness, gait steady       Discussion/Summary:   # Abnormal exercise stress test done on 8/01/23 showed Horizontal ST depression 1.0mm in inferior leads II, III and aVF. ST depression during stress and ended in recovery. It was recommended he undergo repeat stress test imaging with a stress echocardiogram/nuclear stress test.  I have reviewed the stress test results with Mr Juan Carpenter. He denies CP, admits to SOB with exertion. ASCVD risk score 18.8 % . I have ordered an exercise stress echocardiogram R/O ischemia and need for cardiac catheterization. Continue on ASA 81mg daily, Crestor 5mg daily, Losartan 50mg daily, Heart healthy diet,  Walking ok for now, instructed on no strenuous exercise until stress echo results.      # Hypertension RUE sitting 138/70 continue on Losartan 50mg daily, DASH diet  # Hyperlipidemia 6/22/23 , , HDL 53, LDL 77  Continue on Crestor 5mg daily, heart healthy diet   # Aortic root mildly dilated 4.0cm continue with yearly follow up. BP control.    # Pre DM2 HgbA1C 5.8 on 6/22/23 continue on Glucophage 500mg BID, follow up with PCP   Mr Yony Linares requesting to follow up with Dr Phan Valle

## 2023-10-04 ENCOUNTER — HOSPITAL ENCOUNTER (OUTPATIENT)
Dept: NON INVASIVE DIAGNOSTICS | Facility: CLINIC | Age: 63
Discharge: HOME | End: 2023-10-04
Payer: COMMERCIAL

## 2023-10-04 VITALS
SYSTOLIC BLOOD PRESSURE: 132 MMHG | HEIGHT: 71 IN | OXYGEN SATURATION: 97 % | BODY MASS INDEX: 27.72 KG/M2 | WEIGHT: 198 LBS | HEART RATE: 86 BPM | DIASTOLIC BLOOD PRESSURE: 80 MMHG | RESPIRATION RATE: 16 BRPM

## 2023-10-04 DIAGNOSIS — I10 PRIMARY HYPERTENSION: ICD-10-CM

## 2023-10-04 DIAGNOSIS — E78.5 HYPERLIPIDEMIA, UNSPECIFIED HYPERLIPIDEMIA TYPE: ICD-10-CM

## 2023-10-04 LAB
MAX HR PERCENT: 92 %
MAX HR: 146 BPM
RATE PRESSURE PRODUCT: NORMAL
SL CV LV EF: 60
SL CV STRESS RECOVERY BP: NORMAL MMHG
SL CV STRESS RECOVERY HR: 100 BPM
SL CV STRESS RECOVERY O2 SAT: 97 %
STRESS ANGINA INDEX: 0
STRESS BASELINE BP: NORMAL MMHG
STRESS BASELINE HR: 86 BPM
STRESS O2 SAT REST: 97 %
STRESS PEAK HR: 146 BPM
STRESS POST ESTIMATED WORKLOAD: 10.1 METS
STRESS POST EXERCISE DUR MIN: 9 MIN
STRESS POST EXERCISE DUR SEC: 0 SEC
STRESS POST O2 SAT PEAK: 97 %
STRESS POST PEAK BP: 218 MMHG

## 2023-10-04 PROCEDURE — 93350 STRESS TTE ONLY: CPT | Performed by: INTERNAL MEDICINE

## 2023-10-04 PROCEDURE — 93350 STRESS TTE ONLY: CPT

## 2023-10-05 ENCOUNTER — TELEPHONE (OUTPATIENT)
Dept: CARDIOLOGY CLINIC | Facility: CLINIC | Age: 63
End: 2023-10-05

## 2023-10-05 LAB
CHEST PAIN STATEMENT: NORMAL
MAX DIASTOLIC BP: 74 MMHG
MAX HEART RATE: 146 BPM
MAX PREDICTED HEART RATE: 157 BPM
MAX. SYSTOLIC BP: 218 MMHG
PROTOCOL NAME: NORMAL
REASON FOR TERMINATION: NORMAL
TARGET HR FORMULA: NORMAL
TEST INDICATION: NORMAL
TIME IN EXERCISE PHASE: NORMAL

## 2023-10-05 NOTE — TELEPHONE ENCOUNTER
----- Message from Jie Cavazos, [address] [place] [address] sent at 10/5/2023  8:15 AM EDT -----  Please call Mr Pat Payton and inform him the stress echo was normal.  Thank you     Spoke with pt re: normal ST/Echo rslts.

## 2023-10-09 ENCOUNTER — PREP FOR PROCEDURE (OUTPATIENT)
Age: 63
End: 2023-10-09

## 2023-10-09 ENCOUNTER — TELEPHONE (OUTPATIENT)
Age: 63
End: 2023-10-09

## 2023-10-09 DIAGNOSIS — Z86.010 HISTORY OF COLON POLYPS: Primary | ICD-10-CM

## 2023-10-09 NOTE — TELEPHONE ENCOUNTER
Scheduled date of colonoscopy (as of today): 1/24/23    Physician performing colonoscopy: Paola Espinoza    Location of colonoscopy: [facility]    Bowel prep reviewed with patient: Tk/Belen Scott

## 2023-10-09 NOTE — TELEPHONE ENCOUNTER
10/09/23  Screened by: Juarez Goddard    Referring Provider Alcus Dye    Pre- Screening: There is no height or weight on file to calculate BMI. Has patient been referred for a routine screening Colonoscopy? yes  Is the patient between 43-73 years old? yes      Previous Colonoscopy yes   If yes:    Date:     Facility:     Reason:      SCHEDULING STAFF: If the patient is between 39yrs-51yrs, please advise patient to confirm benefits/coverage with their insurance company for a routine screening colonoscopy, some insurance carriers will only cover at [address] [place] [address] or older. If the patient is over 66years old, please schedule an office visit. Does the patient want to see a Gastroenterologist prior to their procedure OR are they having any GI symptoms? no    Has the patient been hospitalized or had abdominal surgery in the past 6 months? no    Does the patient use supplemental oxygen? no    Does the patient take Coumadin, Lovenox, Plavix, Elliquis, Xarelto, or other blood thinning medication? no    Has the patient had a stroke, cardiac event, or stent placed in the past year? no    SCHEDULING STAFF: If patient answers NO to above questions, then schedule procedure. If patient answers YES to above questions, then schedule office appointment. If patient is between 45yrs - 49yrs, please advise patient that we will have to confirm benefits & coverage with their insurance company for a routine screening colonoscopy.

## 2023-11-01 DIAGNOSIS — E11.9 TYPE 2 DIABETES MELLITUS WITHOUT COMPLICATION, WITHOUT LONG-TERM CURRENT USE OF INSULIN (HCC): ICD-10-CM

## 2023-11-02 DIAGNOSIS — I10 ESSENTIAL HYPERTENSION: ICD-10-CM

## 2023-11-02 RX ORDER — LOSARTAN POTASSIUM 50 MG/1
50 TABLET ORAL DAILY
Qty: 90 TABLET | Refills: 0 | Status: SHIPPED | OUTPATIENT
Start: 2023-11-02

## 2023-11-02 NOTE — TELEPHONE ENCOUNTER
Reason for call:   [x] Refill   [] Prior Auth  [] Other:     Office:   [x] PCP/Provider -   [] Specialty/Provider -     Medication: losartan    Dose/Frequency: 50mg    Quantity: #90    Pharm[facility]: [facility]    Does the patient have enough for 3 days?    [] Yes   [x] No - Send as HP to POD

## 2023-11-25 LAB
25(OH)D3 SERPL-MCNC: 38 NG/ML (ref 30–100)
ALBUMIN SERPL-MCNC: 4.4 G/DL (ref 3.6–5.1)
ALBUMIN/GLOB SERPL: 1.7 (CALC) (ref 1–2.5)
ALP SERPL-CCNC: 56 U/L (ref 35–144)
ALT SERPL-CCNC: 46 U/L (ref 9–46)
AST SERPL-CCNC: 34 U/L (ref 10–35)
BILIRUB SERPL-MCNC: 0.7 MG/DL (ref 0.2–1.2)
BUN SERPL-MCNC: 22 MG/DL (ref 7–25)
BUN/CREAT SERPL: ABNORMAL (CALC) (ref 6–22)
CALCIUM SERPL-MCNC: 9.6 MG/DL (ref 8.6–10.3)
CHLORIDE SERPL-SCNC: 103 MMOL/L (ref 98–110)
CHOLEST SERPL-MCNC: 158 MG/DL
CHOLEST/HDLC SERPL: 2.9 (CALC)
CO2 SERPL-SCNC: 28 MMOL/L (ref 20–32)
CREAT SERPL-MCNC: 1.1 MG/DL (ref 0.7–1.35)
EST. AVERAGE GLUCOSE BLD GHB EST-MCNC: 123 MG/DL
EST. AVERAGE GLUCOSE BLD GHB EST-SCNC: 6.8 MMOL/L
GFR/BSA.PRED SERPLBLD CYS-BASED-ARV: 75 ML/MIN/1.73M2
GLOBULIN SER CALC-MCNC: 2.6 G/DL (CALC) (ref 1.9–3.7)
GLUCOSE SERPL-MCNC: 103 MG/DL (ref 65–99)
HBA1C MFR BLD: 5.9 % OF TOTAL HGB
HDLC SERPL-MCNC: 55 MG/DL
LDLC SERPL CALC-MCNC: 79 MG/DL (CALC)
NONHDLC SERPL-MCNC: 103 MG/DL (CALC)
POTASSIUM SERPL-SCNC: 4.9 MMOL/L (ref 3.5–5.3)
PROT SERPL-MCNC: 7 G/DL (ref 6.1–8.1)
PSA SERPL-MCNC: 0.34 NG/ML
SODIUM SERPL-SCNC: 140 MMOL/L (ref 135–146)
TRIGL SERPL-MCNC: 140 MG/DL

## 2023-12-05 ENCOUNTER — OFFICE VISIT (OUTPATIENT)
Dept: FAMILY MEDICINE CLINIC | Facility: CLINIC | Age: 63
End: 2023-12-05
Payer: COMMERCIAL

## 2023-12-05 VITALS
TEMPERATURE: 96 F | BODY MASS INDEX: 27.02 KG/M2 | OXYGEN SATURATION: 99 % | WEIGHT: 193 LBS | DIASTOLIC BLOOD PRESSURE: 84 MMHG | HEART RATE: 80 BPM | RESPIRATION RATE: 14 BRPM | SYSTOLIC BLOOD PRESSURE: 126 MMHG | HEIGHT: 71 IN

## 2023-12-05 DIAGNOSIS — E78.00 PURE HYPERCHOLESTEROLEMIA: ICD-10-CM

## 2023-12-05 DIAGNOSIS — E55.9 VITAMIN D DEFICIENCY: ICD-10-CM

## 2023-12-05 DIAGNOSIS — R73.02 IGT (IMPAIRED GLUCOSE TOLERANCE): ICD-10-CM

## 2023-12-05 DIAGNOSIS — J30.9 ALLERGIC RHINITIS, UNSPECIFIED SEASONALITY, UNSPECIFIED TRIGGER: Primary | ICD-10-CM

## 2023-12-05 DIAGNOSIS — I10 ESSENTIAL HYPERTENSION: ICD-10-CM

## 2023-12-05 PROCEDURE — 99396 PREV VISIT EST AGE 40-64: CPT | Performed by: FAMILY MEDICINE

## 2023-12-05 RX ORDER — CETIRIZINE HYDROCHLORIDE 10 MG/1
10 TABLET ORAL DAILY
Qty: 90 TABLET | Refills: 0 | Status: SHIPPED | OUTPATIENT
Start: 2023-12-05

## 2023-12-05 NOTE — PATIENT INSTRUCTIONS
1.) Nasal rinse - Derrick Med, Lucila Rosenthal pot  2.) Flonase - 1 spray each nostril at bedtime  3.) Zyrtec 10 mg by mouth daily at bedtime

## 2023-12-05 NOTE — PROGRESS NOTES
ADULT ANNUAL PHYSICAL  [facility]    NAME: Rigo Tavarez  AGE: 63 y.o. SEX: male  : 1960     DATE: 2023     Assessment and Plan:     Problem List Items Addressed This Visit       Allergic rhinitis - Primary    Relevant Medications    cetirizine (ZyrTEC) 10 mg tablet    Hyperlipidemia (Chronic)    Relevant Orders    Lipid Panel with Direct LDL reflex     Other Visit Diagnoses       IGT (impaired glucose tolerance)        Relevant Orders    HEMOGLOBIN A1C W/ EAG ESTIMATION    Comprehensive metabolic panel    Vitamin D deficiency        Relevant Orders    Vitamin D 25 hydroxy    Essential hypertension        Relevant Orders    Comprehensive metabolic panel            Immunizations and preventive care screenings were discussed with patient today. Appropriate education was printed on patient's after visit summary.    Discussed risks and benefits of prostate cancer screening. We discussed the controversial history of PSA screening for prostate cancer in the [place] as well as the risk of over detection and over treatment of prostate cancer by way of PSA screening.  The patient understands that PSA blood testing is an imperfect way to screen for prostate cancer and that elevated PSA levels in the blood may also be caused by infection, inflammation, prostatic trauma or manipulation, urological procedures, or by benign prostatic enlargement.    The role of the digital rectal examination in prostate cancer screening was also discussed and I discussed with him that there is large interobserver variability in the findings of digital rectal examination.    Counseling:  Alcohol/drug use: discussed moderation in alcohol intake, the recommendations for healthy alcohol use, and avoidance of illicit drug use.  Dental Health: discussed importance of regular tooth brushing, flossing, and dental visits.  Injury prevention: discussed safety/seat belts, safety  helmets, smoke detectors, carbon dioxide detectors, and smoking near bedding or upholstery.  Sexual health: discussed sexually transmitted diseases, partner selection, use of condoms, avoidance of unintended pregnancy, and contraceptive alternatives.  Exercise: the importance of regular exercise/physical activity was discussed. Recommend exercise 3-5 times per week for at least 30 minutes.     BMI Counseling: Body mass index is 26.92 kg/m². The BMI is above normal. Nutrition recommendations include decreasing portion sizes, encouraging healthy choices of fruits and vegetables, decreasing fast food intake, limiting drinks that contain sugar, moderation in carbohydrate intake, increasing intake of lean protein and reducing intake of cholesterol. Exercise recommendations include moderate physical activity 150 minutes/week, exercising 3-5 times per week, obtaining a gym membership and strength training exercises. No pharmacotherapy was ordered. Patient referred to PCP. Rationale for BMI follow-up plan is due to patient being overweight or obese.         Return in about 6 months (around 6/5/2024).     Chief Complaint:     Chief Complaint   Patient presents with    Follow-up     Patient being seen for 6 month follow up      History of Present Illness:     Adult Annual Physical   Patient here for a comprehensive physical exam. The patient reports no problems.    Diet and Physical Activity  Diet/Nutrition: well balanced diet, limited junk food, and consuming 3-5 servings of fruits/vegetables daily.   Exercise: walking and 3-4 times a week on average.      Depression Screening  PHQ-2/9 Depression Screening           General Health  Sleep: sleeps well and gets 7-8 hours of sleep on average.   Hearing: normal - bilateral.  Vision: goes for regular eye exams.   Dental: regular dental visits and brushes teeth twice daily.        Health  Symptoms include: none    Advanced Care Planning  Do you have an advanced directive? no  Do  you have a durable medical power of ? no     Review of Systems:     Review of Systems   Constitutional:  Negative for chills and fever.   HENT:  Negative for ear pain and sore throat.    Eyes:  Negative for pain and visual disturbance.   Respiratory:  Negative for cough and shortness of breath.    Cardiovascular:  Negative for chest pain and palpitations.   Gastrointestinal:  Negative for abdominal pain and vomiting.   Genitourinary:  Negative for dysuria and hematuria.   Musculoskeletal:  Negative for arthralgias and back pain.   Skin:  Negative for color change and rash.   Neurological:  Negative for seizures and syncope.   Psychiatric/Behavioral:  Negative for confusion, sleep disturbance and suicidal ideas.    All other systems reviewed and are negative.     Past Medical History:     Past Medical History:   Diagnosis Date    Allergic     Joint pain     Laceration of lower leg     last assessed 06/06/13      Past Surgical History:     History reviewed. No pertinent surgical history.   Family History:     Family History   Problem Relation Age of Onset    No Known Problems Mother       Social History:     Social History     Socioeconomic History    Marital status: /Civil Union     Spouse name: None    Number of children: None    Years of education: None    Highest education level: None   Occupational History    None   Tobacco Use    Smoking status: Never    Smokeless tobacco: Never   Vaping Use    Vaping status: Never Used   Substance and Sexual Activity    Alcohol use: Yes     Alcohol/week: 2.0 standard drinks of alcohol     Types: 2 Cans of beer per week     Comment: occasional    Drug use: Never    Sexual activity: None   Other Topics Concern    None   Social History Narrative    None     Social Determinants of Health     Financial Resource Strain: Not on file   Food Insecurity: Not on file   Transportation Needs: Not on file   Physical Activity: Not on file   Stress: Not on file   Social  "Connections: Not on file   Intimate Partner Violence: Not on file   Housing Stability: Not on file      Current Medications:     Current Outpatient Medications   Medication Sig Dispense Refill    aspirin 81 MG tablet Take 81 mg by mouth daily      cetirizine (ZyrTEC) 10 mg tablet Take 1 tablet (10 mg total) by mouth daily (Patient taking differently: Take 5 mg by mouth daily) 90 tablet 0    cholecalciferol (VITAMIN D3) 1,000 units tablet Take 2,000 Units by mouth daily      cyanocobalamin (VITAMIN B-12) 1,000 mcg tablet Take by mouth daily      Fish Oil-Cholecalciferol (FISH OIL + D3 PO) Take by mouth      fluticasone (FLONASE) 50 mcg/act nasal spray into each nostril      Glucosamine Sulfate 500 MG TABS       metFORMIN (GLUCOPHAGE) 500 mg tablet TAKE 1 TABLET BY MOUTH TWICE A  tablet 1    Multiple Vitamins-Iron (QC DAILY MULTIVITAMINS/IRON) TABS Take by mouth      rosuvastatin (CRESTOR) 5 mg tablet Take 1 tablet (5 mg total) by mouth daily 90 tablet 1    atenolol (TENORMIN) 25 mg tablet Take 1 tablet (25 mg total) by mouth daily 90 tablet 3    losartan (COZAAR) 25 mg tablet TAKE 1 TABLET (25 MG TOTAL) BY MOUTH DAILY. 90 tablet 3    mesalamine (ASACOL) 800 MG EC tablet Take 1 tablet (800 mg total) by mouth 3 (three) times a day 270 tablet 3     No current facility-administered medications for this visit.      Allergies:     Allergies   Allergen Reactions    Penicillins     Seasonal Ic [Cholestatin]       Physical Exam:     /84 (BP Location: Left arm, Patient Position: Sitting, Cuff Size: Large)   Pulse 80   Temp (!) 96 °F (35.6 °C) (Tympanic)   Resp 14   Ht 5' 11\" (1.803 m)   Wt 87.5 kg (193 lb)   SpO2 99%   BMI 26.92 kg/m²     Physical Exam  Vitals and nursing note reviewed.   Constitutional:       General: He is not in acute distress.     Appearance: Normal appearance.   HENT:      Head: Normocephalic and atraumatic.      Right Ear: Tympanic membrane and external ear normal.      Left Ear: " Tympanic membrane and external ear normal.      Nose: Nose normal.      Mouth/Throat:      Mouth: Mucous membranes are moist.   Eyes:      Extraocular Movements: Extraocular movements intact.      Conjunctiva/sclera: Conjunctivae normal.      Pupils: Pupils are equal, round, and reactive to light.   Cardiovascular:      Rate and Rhythm: Normal rate and regular rhythm.      Pulses: Normal pulses.      Heart sounds: Normal heart sounds. No murmur heard.  Pulmonary:      Effort: Pulmonary effort is normal.      Breath sounds: Normal breath sounds. No wheezing, rhonchi or rales.   Abdominal:      General: Bowel sounds are normal.      Palpations: Abdomen is soft.      Tenderness: There is no abdominal tenderness. There is no guarding.   Musculoskeletal:         General: Normal range of motion.      Cervical back: Normal range of motion.      Right lower leg: No edema.      Left lower leg: No edema.   Lymphadenopathy:      Cervical: No cervical adenopathy.   Skin:     General: Skin is warm.      Capillary Refill: Capillary refill takes less than 2 seconds.   Neurological:      General: No focal deficit present.      Mental Status: He is alert and oriented to person, place, and time.   Psychiatric:         Mood and Affect: Mood normal.         Behavior: Behavior normal.          DO ARPAN Parks [facility]

## 2023-12-14 ENCOUNTER — OFFICE VISIT (OUTPATIENT)
Dept: CARDIOLOGY CLINIC | Facility: CLINIC | Age: 63
End: 2023-12-14
Payer: COMMERCIAL

## 2023-12-14 VITALS
SYSTOLIC BLOOD PRESSURE: 136 MMHG | HEIGHT: 73 IN | HEART RATE: 88 BPM | WEIGHT: 203.5 LBS | OXYGEN SATURATION: 97 % | BODY MASS INDEX: 26.97 KG/M2 | DIASTOLIC BLOOD PRESSURE: 76 MMHG

## 2023-12-14 DIAGNOSIS — R06.09 DOE (DYSPNEA ON EXERTION): Primary | ICD-10-CM

## 2023-12-14 DIAGNOSIS — E78.5 HYPERLIPIDEMIA, UNSPECIFIED HYPERLIPIDEMIA TYPE: Chronic | ICD-10-CM

## 2023-12-14 DIAGNOSIS — Q24.8 LEFT VENTRICULAR OUTFLOW TRACT OBSTRUCTION: ICD-10-CM

## 2023-12-14 DIAGNOSIS — I10 ESSENTIAL HYPERTENSION: ICD-10-CM

## 2023-12-14 PROCEDURE — 99215 OFFICE O/P EST HI 40 MIN: CPT | Performed by: INTERNAL MEDICINE

## 2023-12-14 RX ORDER — LOSARTAN POTASSIUM 25 MG/1
25 TABLET ORAL DAILY
Qty: 30 TABLET | Refills: 6 | Status: SHIPPED | OUTPATIENT
Start: 2023-12-14 | End: 2023-12-15

## 2023-12-14 RX ORDER — ATENOLOL 25 MG/1
25 TABLET ORAL DAILY
Qty: 90 TABLET | Refills: 3 | Status: SHIPPED | OUTPATIENT
Start: 2023-12-14

## 2023-12-14 NOTE — PROGRESS NOTES
[facility] Cardiology  Follow up note  Es Alvarado 61 y.o. male MRN: [id number]        Problems    1. GARCIA (dyspnea on exertion)        2. Essential hypertension  losartan (COZAAR) 25 mg tablet      3. Left ventricular outflow tract obstruction  atenolol (TENORMIN) 25 mg tablet      4. Hyperlipidemia, unspecified hyperlipidemia type            Impression:    LVOT obstruction  6/23 he had dyspnea on exertion with yard work  PCP ordered a treadmill stress test which was borderline abnormal  Follow-up stress echo was obtained, unremarkable from an ischemic standpoint, but hyperdynamic at peak stress with some mitral chordal interaction with the septal wall, but no peak stress gradient obtained  Physiologic maneuvers in the office with significant murmur upon standing, resolves with laying and legs elevated  I suspect his original dyspnea in the context of probably mild hypovolemia during the summer, does not drink a lot of water, may have been due to his LVOT obstruction, he has never had syncope, his echo and EKG do not suggest the presence of significant HCM  Hypertension  Controlled on losartan, but not ideal in the context of LVOT obstruction  Hypercholesterolemia  Controlled on rosuvastatin    Plan:    Judicious fluid intake, 60 to 90 ounces  Gatorade 0 is okay  Will decrease losartan to 25 mg daily  Will start atenolol 25 mg daily  Follow-up in 3 months, check home blood pressure logs, any concerns message me sooner. Goal will be to get him off of losartan if he remains normotensive with the above changes. I do not see any high risk features that warrant MRI imaging      HPI:   Es Alvarado is a 61y.o. year old male who was dyspneic on exertion over the summer, PCP ordered a stress test, interpreted as abnormal with inferior ischemia, borderline upon my personal review, follow-up stress echo was more definitive, negative for any ischemia but hyperdynamic.   Resting studies personally reviewed do indicate a hypermobile chordal tendon a, with some mild prolapse into the LVOT, but no significant gradient at rest.  He has never had syncope. He has no history of familial HCM. He has hypertension treated with losartan. Blood pressure is well-controlled. He does not drink a lot of fluid and admits that even in the summertime when he is mowing the lawn and outdoors for 2 hours in the heat, he does not do a lot of prehydration. Cholesterol is well-controlled on low-dose statin. Review of Systems   Constitutional:  Negative for appetite change, diaphoresis, fatigue and fever. Respiratory:  Positive for shortness of breath. Negative for chest tightness and wheezing. Cardiovascular:  Negative for chest pain, palpitations and leg swelling. Gastrointestinal:  Negative for abdominal pain and blood in stool. Musculoskeletal:  Negative for arthralgias and joint swelling. Skin:  Negative for rash. Neurological:  Positive for light-headedness. Negative for dizziness and syncope. Past Medical History:   Diagnosis Date   • Allergic    • Joint pain    • Laceration of lower leg     last assessed 06/06/13     Social History     Substance and Sexual Activity   Alcohol Use Yes   • Alcohol/week: 2.0 standard drinks of alcohol   • Types: 2 Cans of beer per week    Comment: occasional     Social History     Substance and Sexual Activity   Drug Use Never     Social History     Tobacco Use   Smoking Status Never   Smokeless Tobacco Never       Allergies:   Allergies   Allergen Reactions   • Penicillins    • Seasonal Ic [Cholestatin]        Medications:     Current Outpatient Medications:   •  aspirin 81 MG tablet, Take 81 mg by mouth daily, Disp: , Rfl:   •  atenolol (TENORMIN) 25 mg tablet, Take 1 tablet (25 mg total) by mouth daily, Disp: 90 tablet, Rfl: 3  •  cetirizine (ZyrTEC) 10 mg tablet, Take 1 tablet (10 mg total) by mouth daily (Patient taking differently: Take 5 mg by mouth daily), Disp: 90 tablet, Rfl: 0  • cholecalciferol (VITAMIN D3) 1,000 units tablet, Take 2,000 Units by mouth daily, Disp: , Rfl:   •  cyanocobalamin (VITAMIN B-12) 1,000 mcg tablet, Take by mouth daily, Disp: , Rfl:   •  Fish Oil-Cholecalciferol (FISH OIL + D3 PO), Take by mouth, Disp: , Rfl:   •  fluticasone (FLONASE) 50 mcg/act nasal spray, into each nostril, Disp: , Rfl:   •  Glucosamine Sulfate 500 MG TABS, , Disp: , Rfl:   •  losartan (COZAAR) 25 mg tablet, Take 1 tablet (25 mg total) by mouth daily, Disp: 30 tablet, Rfl: 6  •  mesalamine (ASACOL) 800 MG EC tablet, Take 1 tablet (800 mg total) by mouth 3 (three) times a day, Disp: 270 tablet, Rfl: 3  •  metFORMIN (GLUCOPHAGE) 500 mg tablet, TAKE 1 TABLET BY MOUTH TWICE A DAY, Disp: 180 tablet, Rfl: 1  •  Multiple Vitamins-Iron (QC DAILY MULTIVITAMINS/IRON) TABS, Take by mouth, Disp: , Rfl:   •  rosuvastatin (CRESTOR) 5 mg tablet, Take 1 tablet (5 mg total) by mouth daily, Disp: 90 tablet, Rfl: 1      Vitals:    12/14/23 1308   BP: 136/76   Pulse: 88   SpO2: 97%     Weight (last 2 days)     Date/Time Weight    12/14/23 1308 92.3 (203.5)        Physical Exam  Constitutional:       General: He is not in acute distress. Appearance: He is not diaphoretic. HENT:      Head: Normocephalic and atraumatic. Eyes:      General: No scleral icterus. Conjunctiva/sclera: Conjunctivae normal.   Neck:      Vascular: No JVD. Cardiovascular:      Rate and Rhythm: Normal rate and regular rhythm. Heart sounds: Murmur (3/6 systolic murmur in the standing position, and worse with Valsalva, almost an audible less than 1/6 in the aortic position when laying down with feet elevated) heard. Pulmonary:      Effort: Pulmonary effort is normal. No respiratory distress. Breath sounds: Normal breath sounds. No decreased breath sounds, wheezing, rhonchi or rales. Musculoskeletal:      Cervical back: Normal range of motion. Right lower leg: Normal. No edema. Left lower leg: Normal. No edema. Skin:     General: Skin is warm and dry. Neurological:      Mental Status: He is alert and oriented to person, place, and time. Laboratory Studies:    Laboratory studies personally reviewed    Cardiac testing:     EKG reviewed personally:   No results found for this visit on 12/14/23. Echocardiogram:      Stress tests:      Catheterization:      Holter:         Vidal Carmichael MD    Portions of the record may have been created with voice recognition software. Occasional wrong word or "sound a like" substitutions may have occurred due to the inherent limitations of voice recognition software. Read the chart carefully and recognize, using context, where substitutions have occurred.

## 2023-12-15 DIAGNOSIS — I10 ESSENTIAL HYPERTENSION: ICD-10-CM

## 2023-12-15 RX ORDER — LOSARTAN POTASSIUM 25 MG/1
25 TABLET ORAL DAILY
Qty: 90 TABLET | Refills: 3 | Status: SHIPPED | OUTPATIENT
Start: 2023-12-15

## 2024-02-08 ENCOUNTER — ANESTHESIA EVENT (OUTPATIENT)
Dept: ANESTHESIOLOGY | Facility: HOSPITAL | Age: 64
End: 2024-02-08

## 2024-02-08 ENCOUNTER — ANESTHESIA (OUTPATIENT)
Dept: ANESTHESIOLOGY | Facility: HOSPITAL | Age: 64
End: 2024-02-08

## 2024-02-08 RX ORDER — SODIUM CHLORIDE, SODIUM LACTATE, POTASSIUM CHLORIDE, CALCIUM CHLORIDE 600; 310; 30; 20 MG/100ML; MG/100ML; MG/100ML; MG/100ML
125 INJECTION, SOLUTION INTRAVENOUS CONTINUOUS
Status: CANCELLED | OUTPATIENT
Start: 2024-02-08

## 2024-02-09 ENCOUNTER — ANESTHESIA EVENT (OUTPATIENT)
Dept: GASTROENTEROLOGY | Facility: AMBULARY SURGERY CENTER | Age: 64
End: 2024-02-09

## 2024-02-09 ENCOUNTER — HOSPITAL ENCOUNTER (OUTPATIENT)
Dept: GASTROENTEROLOGY | Facility: AMBULARY SURGERY CENTER | Age: 64
Setting detail: OUTPATIENT SURGERY
End: 2024-02-09
Attending: COLON & RECTAL SURGERY
Payer: COMMERCIAL

## 2024-02-09 ENCOUNTER — ANESTHESIA (OUTPATIENT)
Dept: GASTROENTEROLOGY | Facility: AMBULARY SURGERY CENTER | Age: 64
End: 2024-02-09

## 2024-02-09 VITALS
HEART RATE: 66 BPM | HEIGHT: 74 IN | DIASTOLIC BLOOD PRESSURE: 74 MMHG | RESPIRATION RATE: 18 BRPM | BODY MASS INDEX: 24.9 KG/M2 | OXYGEN SATURATION: 98 % | WEIGHT: 194 LBS | TEMPERATURE: 97.3 F | SYSTOLIC BLOOD PRESSURE: 129 MMHG

## 2024-02-09 DIAGNOSIS — Z86.010 HISTORY OF COLON POLYPS: ICD-10-CM

## 2024-02-09 LAB — GLUCOSE SERPL-MCNC: 95 MG/DL (ref 65–140)

## 2024-02-09 PROCEDURE — 88305 TISSUE EXAM BY PATHOLOGIST: CPT | Performed by: PATHOLOGY

## 2024-02-09 PROCEDURE — 45380 COLONOSCOPY AND BIOPSY: CPT | Performed by: COLON & RECTAL SURGERY

## 2024-02-09 PROCEDURE — 82948 REAGENT STRIP/BLOOD GLUCOSE: CPT

## 2024-02-09 RX ORDER — SODIUM CHLORIDE, SODIUM LACTATE, POTASSIUM CHLORIDE, CALCIUM CHLORIDE 600; 310; 30; 20 MG/100ML; MG/100ML; MG/100ML; MG/100ML
125 INJECTION, SOLUTION INTRAVENOUS CONTINUOUS
Status: DISCONTINUED | OUTPATIENT
Start: 2024-02-09 | End: 2024-02-13 | Stop reason: HOSPADM

## 2024-02-09 RX ORDER — SODIUM CHLORIDE, SODIUM LACTATE, POTASSIUM CHLORIDE, CALCIUM CHLORIDE 600; 310; 30; 20 MG/100ML; MG/100ML; MG/100ML; MG/100ML
INJECTION, SOLUTION INTRAVENOUS CONTINUOUS PRN
Status: DISCONTINUED | OUTPATIENT
Start: 2024-02-09 | End: 2024-02-09

## 2024-02-09 RX ORDER — LIDOCAINE HYDROCHLORIDE 10 MG/ML
INJECTION, SOLUTION EPIDURAL; INFILTRATION; INTRACAUDAL; PERINEURAL AS NEEDED
Status: DISCONTINUED | OUTPATIENT
Start: 2024-02-09 | End: 2024-02-09

## 2024-02-09 RX ORDER — PROPOFOL 10 MG/ML
INJECTION, EMULSION INTRAVENOUS AS NEEDED
Status: DISCONTINUED | OUTPATIENT
Start: 2024-02-09 | End: 2024-02-09

## 2024-02-09 RX ADMIN — PROPOFOL 50 MG: 10 INJECTION, EMULSION INTRAVENOUS at 09:10

## 2024-02-09 RX ADMIN — PROPOFOL 50 MG: 10 INJECTION, EMULSION INTRAVENOUS at 09:05

## 2024-02-09 RX ADMIN — PROPOFOL 50 MG: 10 INJECTION, EMULSION INTRAVENOUS at 08:51

## 2024-02-09 RX ADMIN — PROPOFOL 100 MG: 10 INJECTION, EMULSION INTRAVENOUS at 08:48

## 2024-02-09 RX ADMIN — PROPOFOL 50 MG: 10 INJECTION, EMULSION INTRAVENOUS at 08:55

## 2024-02-09 RX ADMIN — PROPOFOL 50 MG: 10 INJECTION, EMULSION INTRAVENOUS at 08:59

## 2024-02-09 RX ADMIN — SODIUM CHLORIDE, SODIUM LACTATE, POTASSIUM CHLORIDE, AND CALCIUM CHLORIDE: .6; .31; .03; .02 INJECTION, SOLUTION INTRAVENOUS at 08:41

## 2024-02-09 RX ADMIN — LIDOCAINE HYDROCHLORIDE 50 MG: 10 INJECTION, SOLUTION EPIDURAL; INFILTRATION; INTRACAUDAL; PERINEURAL at 08:48

## 2024-02-09 NOTE — ANESTHESIA PREPROCEDURE EVALUATION
Procedure:  COLONOSCOPY    Relevant Problems   CARDIO   (+) Hyperlipidemia   (+) Hypertension   (+) SVT (supraventricular tachycardia)      /RENAL   (+) Benign enlargement of prostate      ECHO 8/2023: hypermobile chordal tendon a, with some mild prolapse into the LVOT, but no significant gradient at rest.  He has never had syncope. ? MILTON  in ECHO  Physical Exam    Airway    Mallampati score: III  TM Distance: >3 FB  Neck ROM: full     Dental   No notable dental hx     Cardiovascular      Pulmonary      Other Findings        Anesthesia Plan  ASA Score- 3     Anesthesia Type- IV sedation with anesthesia with ASA Monitors.         Additional Monitors:     Airway Plan:     Comment: Provide fluid bolus preop.       Plan Factors-    Chart reviewed.    Patient summary reviewed.    Patient is not a current smoker.              Induction- intravenous.    Postoperative Plan-     Informed Consent- Anesthetic plan and risks discussed with patient.  I personally reviewed this patient with the CRNA. Discussed and agreed on the Anesthesia Plan with the CRNA..

## 2024-02-09 NOTE — H&P
"History and Physical   Colon and Rectal Surgery   Rigo Tavarez 63 y.o. male MRN: [id number]  Unit/Bed#:  Encounter: [id number]  02/09/24   8:44 AM      No chief complaint on file.        ASSESSMENT:  Rigo Tavarez is a 63 y.o. male who presents for colonoscopy, hx Crohn's.  Colonoscopy,discussed in a face-to-face, personal, informed consent process, the benefits, alternatives, risks including not limited to bleeding, missed lesion, perforation requiring emergent surgery discussed/understood.      PLAN:  Colonoscopy    History of Present Illness   HPI:  Rigo Tavarez is a 63 y.o. male who presents for colonoscopy. Sigmoiditis, Crohn's, on mesalamine.      Historical Information   Past Medical History:   Diagnosis Date    Allergic     Colon polyp     Diabetes mellitus (HCC)     \"borderline\"    Hyperlipidemia     Hypertension     Joint pain     Laceration of lower leg     last assessed 06/06/13     Past Surgical History:   Procedure Laterality Date    TONSILLECTOMY         Meds/Allergies     (Not in a hospital admission)        Current Outpatient Medications:     aspirin 81 MG tablet, Take 81 mg by mouth daily, Disp: , Rfl:     atenolol (TENORMIN) 25 mg tablet, Take 1 tablet (25 mg total) by mouth daily, Disp: 90 tablet, Rfl: 3    cholecalciferol (VITAMIN D3) 1,000 units tablet, Take 2,000 Units by mouth daily, Disp: , Rfl:     cyanocobalamin (VITAMIN B-12) 1,000 mcg tablet, Take by mouth daily, Disp: , Rfl:     Fish Oil-Cholecalciferol (FISH OIL + D3 PO), Take by mouth, Disp: , Rfl:     fluticasone (FLONASE) 50 mcg/act nasal spray, into each nostril, Disp: , Rfl:     Glucosamine Sulfate 500 MG TABS, , Disp: , Rfl:     losartan (COZAAR) 25 mg tablet, TAKE 1 TABLET (25 MG TOTAL) BY MOUTH DAILY., Disp: 90 tablet, Rfl: 3    mesalamine (ASACOL) 800 MG EC tablet, Take 1 tablet (800 mg total) by mouth 3 (three) times a day, Disp: 270 tablet, Rfl: 3    metFORMIN (GLUCOPHAGE) 500 mg tablet, TAKE 1 TABLET BY MOUTH TWICE A " "DAY, Disp: 180 tablet, Rfl: 1    Multiple Vitamins-Iron (QC DAILY MULTIVITAMINS/IRON) TABS, Take by mouth, Disp: , Rfl:     rosuvastatin (CRESTOR) 5 mg tablet, Take 1 tablet (5 mg total) by mouth daily, Disp: 90 tablet, Rfl: 1    cetirizine (ZyrTEC) 10 mg tablet, Take 1 tablet (10 mg total) by mouth daily (Patient taking differently: Take 5 mg by mouth daily), Disp: 90 tablet, Rfl: 0    Current Facility-Administered Medications:     lactated ringers infusion, 125 mL/hr, Intravenous, Continuous, Katie Obrien MD    Facility-Administered Medications Ordered in Other Encounters:     lactated ringers infusion, , Intravenous, Continuous PRN, Jose Antonio Nuñez, CRNA, New Bag at 02/09/24 0841    Allergies   Allergen Reactions    Penicillins     Seasonal Ic [Cholestatin]          Social History   Social History     Substance and Sexual Activity   Alcohol Use Yes    Alcohol/week: 2.0 standard drinks of alcohol    Types: 2 Cans of beer per week    Comment: occasional     Social History     Substance and Sexual Activity   Drug Use Never     Social History     Tobacco Use   Smoking Status Never   Smokeless Tobacco Never         Family History:   Family History   Problem Relation Age of Onset    No Known Problems Mother          Objective     Current Vitals:   Blood Pressure: 162/91 (02/09/24 0800)  Pulse: 73 (02/09/24 0800)  Temperature: (!) 97.1 °F (36.2 °C) (02/09/24 0800)  Temp Source: Temporal (02/09/24 0800)  Respirations: 18 (02/09/24 0800)  Height: 6' 2\" (188 cm) (02/09/24 0800)  Weight - Scale: 88 kg (194 lb) (02/09/24 0800)  SpO2: 98 % (02/09/24 0800)  No intake or output data in the 24 hours ending 02/09/24 0844    Physical Exam:  General:no distress  Eyes:perrla/eomi  ENT:moist mucus membranes  Neck:supple  Pulm:no increased work of breathing  CV:sinus  Abdomen:soft,nontender  Extremities:no edema    "

## 2024-02-09 NOTE — ANESTHESIA PREPROCEDURE EVALUATION
Procedure:  PRE-OP ONLY    Relevant Problems   CARDIO   (+) Hyperlipidemia   (+) Hypertension   (+) SVT (supraventricular tachycardia)      /RENAL   (+) Benign enlargement of prostate      Cardiovascular and Mediastinum   (+) Left ventricular outflow tract obstruction      Digestive   (+) Crohn's colitis (HCC)   (+) Ulcerative colitis, chronic, unspecified complication (HCC)      Other   (+) Palpitations   (+) Prediabetes      ECHO 8/2023: hypermobile chordal tendon a, with some mild prolapse into the LVOT, but no significant gradient at rest.  He has never had syncope. ? MILTON  in ECHO       Anesthesia Plan  ASA Score- 3     Anesthesia Type- IV sedation with anesthesia with ASA Monitors.         Additional Monitors:     Airway Plan:            Plan Factors-    Chart reviewed.    Patient summary reviewed.    Patient is not a current smoker.              Induction- intravenous.    Postoperative Plan-     Informed Consent-

## 2024-02-09 NOTE — ANESTHESIA POSTPROCEDURE EVALUATION
Post-Op Assessment Note            No anethesia notable event occurred.    Staff: Anesthesiologist               BP      Temp      Pulse     Resp      SpO2

## 2024-02-09 NOTE — ANESTHESIA POSTPROCEDURE EVALUATION
Post-Op Assessment Note    CV Status:  Stable  Pain Score: 0    Pain management: adequate       Mental Status:  Arousable   Hydration Status:  Stable   PONV Controlled:  None   Airway Patency:  Patent     Post Op Vitals Reviewed: Yes      Staff: Anesthesiologist, CRNA               /61 (02/09/24 0920)    Temp (!) 97.3 °F (36.3 °C) (02/09/24 0920)    Pulse 71 (02/09/24 0920)   Resp 16 (02/09/24 0920)    SpO2 99 % (02/09/24 0920)

## 2024-02-12 PROCEDURE — 88305 TISSUE EXAM BY PATHOLOGIST: CPT | Performed by: PATHOLOGY

## 2024-02-18 DIAGNOSIS — E78.00 PURE HYPERCHOLESTEROLEMIA: Chronic | ICD-10-CM

## 2024-02-18 DIAGNOSIS — R73.02 IGT (IMPAIRED GLUCOSE TOLERANCE): ICD-10-CM

## 2024-02-18 RX ORDER — ROSUVASTATIN CALCIUM 5 MG/1
5 TABLET, COATED ORAL DAILY
Qty: 90 TABLET | Refills: 1 | Status: SHIPPED | OUTPATIENT
Start: 2024-02-18

## 2024-03-26 ENCOUNTER — OFFICE VISIT (OUTPATIENT)
Dept: CARDIOLOGY CLINIC | Facility: CLINIC | Age: 64
End: 2024-03-26
Payer: COMMERCIAL

## 2024-03-26 VITALS
DIASTOLIC BLOOD PRESSURE: 84 MMHG | HEIGHT: 73 IN | HEART RATE: 68 BPM | WEIGHT: 204.2 LBS | SYSTOLIC BLOOD PRESSURE: 128 MMHG | OXYGEN SATURATION: 99 % | BODY MASS INDEX: 27.06 KG/M2

## 2024-03-26 DIAGNOSIS — E78.2 MIXED HYPERLIPIDEMIA: Chronic | ICD-10-CM

## 2024-03-26 DIAGNOSIS — Q24.8 LEFT VENTRICULAR OUTFLOW TRACT OBSTRUCTION: ICD-10-CM

## 2024-03-26 DIAGNOSIS — I10 PRIMARY HYPERTENSION: Primary | Chronic | ICD-10-CM

## 2024-03-26 PROCEDURE — 99214 OFFICE O/P EST MOD 30 MIN: CPT | Performed by: INTERNAL MEDICINE

## 2024-03-26 NOTE — PROGRESS NOTES
[facility] Cardiology  Follow up note  Rigo Tavarez 63 y.o. male MRN: [id number]      1. Primary hypertension  Assessment & Plan:  Currently well-controlled, better and more consistent blood pressures when using his wife's manual cuff.  Currently on losartan 25 mg, atenolol 25 mg.      2. Left ventricular outflow tract obstruction  Assessment & Plan:  Sigmoid septum, mitral chordal redundancy, with exertional symptoms of dyspnea last summer, treated with addition of atenolol, dose reduction of losartan, and complete resolution of symptoms.  I reviewed his echo from 8/23 and his stress echo from 10/23 personally with him today and explained the hemodynamic cause of his symptoms.  There is no significant LVH otherwise, or EKG abnormality to suggest HCM, no further testing recommended      3. Mixed hyperlipidemia  Assessment & Plan:  Controlled on rosuvastatin.        Plan:    Blood pressure remains well-controlled and all symptoms resolved with atenolol 25 mg, and dose reduction of losartan to 25 mg from 50 mg as of December, but unclear if he even needs losartan from an antihypertensive standpoint.  We will eliminate losartan for 1 month, check blood pressures twice a week, and if more than 25% of his blood pressures are greater than 135/85, that would warrant reinstitution of losartan 25 mg.  6 month f/u, especially to follow-up summer activities which is when symptoms were more apparent last year.    HPI:   Rigo Tavarez is a 63 y.o. year old male who developed dyspnea last summer, prompting a stress test which was repeated 10/23, negative for ischemia, but hyperdynamic at peak stress, with redundant mitral chordal apparatus, with slight prolapse into the LVOT, and combined with a sigmoid septum, increased velocity at the LVOT by color Doppler acceleration only, but pulse/continuous-wave Doppler not performed, nonetheless had physiological maneuver induced outflow tract murmur in the office, prompting initiation  "of atenolol 25 mg, dose reduction of losartan to 25 mg, with a resting heart rate that is dropped by about 15 points, with complete resolution of all dyspnea symptoms.  Blood pressure remains well-controlled.  He inquires about needing losartan going forward.  Lipids are well-controlled on rosuvastatin.      Review of Systems   Constitutional:  Negative for appetite change, diaphoresis, fatigue and fever.   Respiratory:  Negative for chest tightness, shortness of breath and wheezing.    Cardiovascular:  Negative for chest pain, palpitations and leg swelling.   Gastrointestinal:  Negative for abdominal pain and blood in stool.   Musculoskeletal:  Negative for arthralgias and joint swelling.   Skin:  Negative for rash.   Neurological:  Negative for dizziness, syncope and light-headedness.       Past Medical History:   Diagnosis Date   • Allergic    • Colon polyp    • Diabetes mellitus (HCC)     \"borderline\"   • Hyperlipidemia    • Hypertension    • Joint pain    • Laceration of lower leg     last assessed 06/06/13     Social History     Substance and Sexual Activity   Alcohol Use Yes   • Alcohol/week: 2.0 standard drinks of alcohol   • Types: 2 Cans of beer per week    Comment: occasional     Social History     Substance and Sexual Activity   Drug Use Never     Social History     Tobacco Use   Smoking Status Never   Smokeless Tobacco Never       Allergies:  Allergies   Allergen Reactions   • Penicillins    • Seasonal Ic [Cholestatin]        Medications:     Current Outpatient Medications:   •  aspirin 81 MG tablet, Take 81 mg by mouth daily, Disp: , Rfl:   •  atenolol (TENORMIN) 25 mg tablet, Take 1 tablet (25 mg total) by mouth daily, Disp: 90 tablet, Rfl: 3  •  cetirizine (ZyrTEC) 10 mg tablet, Take 1 tablet (10 mg total) by mouth daily (Patient taking differently: Take 5 mg by mouth daily), Disp: 90 tablet, Rfl: 0  •  cholecalciferol (VITAMIN D3) 1,000 units tablet, Take 2,000 Units by mouth daily, Disp: , Rfl:   • "  cyanocobalamin (VITAMIN B-12) 1,000 mcg tablet, Take by mouth daily, Disp: , Rfl:   •  Fish Oil-Cholecalciferol (FISH OIL + D3 PO), Take by mouth, Disp: , Rfl:   •  fluticasone (FLONASE) 50 mcg/act nasal spray, into each nostril, Disp: , Rfl:   •  Glucosamine Sulfate 500 MG TABS, , Disp: , Rfl:   •  losartan (COZAAR) 25 mg tablet, TAKE 1 TABLET (25 MG TOTAL) BY MOUTH DAILY., Disp: 90 tablet, Rfl: 3  •  mesalamine (ASACOL) 800 MG EC tablet, Take 1 tablet (800 mg total) by mouth 3 (three) times a day, Disp: 270 tablet, Rfl: 3  •  metFORMIN (GLUCOPHAGE) 500 mg tablet, TAKE 1 TABLET BY MOUTH TWICE A DAY, Disp: 180 tablet, Rfl: 1  •  Multiple Vitamins-Iron (QC DAILY MULTIVITAMINS/IRON) TABS, Take by mouth, Disp: , Rfl:   •  rosuvastatin (CRESTOR) 5 mg tablet, TAKE 1 TABLET (5 MG TOTAL) BY MOUTH DAILY., Disp: 90 tablet, Rfl: 1      Vitals:    03/26/24 0930   BP: 128/84   Pulse: 68   SpO2: 99%     Weight (last 2 days)     Date/Time Weight    03/26/24 0930 92.6 (204.2)        Physical Exam  Constitutional:       General: He is not in acute distress.     Appearance: He is not diaphoretic.   HENT:      Head: Normocephalic and atraumatic.   Eyes:      General: No scleral icterus.     Conjunctiva/sclera: Conjunctivae normal.   Neck:      Vascular: No JVD.   Cardiovascular:      Rate and Rhythm: Normal rate and regular rhythm.      Heart sounds: Normal heart sounds. No murmur heard.  Pulmonary:      Effort: Pulmonary effort is normal. No respiratory distress.      Breath sounds: Normal breath sounds. No decreased breath sounds, wheezing, rhonchi or rales.   Musculoskeletal:      Cervical back: Normal range of motion.      Right lower leg: Normal. No edema.      Left lower leg: Normal. No edema.   Skin:     General: Skin is warm and dry.   Neurological:      Mental Status: He is alert and oriented to person, place, and time.         Laboratory Studies:    Labs personally reviewed    Cardiac testing:     EKG reviewed personally:  "  No results found for this visit on 03/26/24.      Echocardiogram:  8/23-personally reviewed-EF normal, sigmoid septum, redundant mitral apparatus with mild prolapse into the LVOT during systole    Stress tests:  10/23-personally reviewed-EF normal, sigmoid septum, redundant mitral valve apparatus with small cavity at peak stress, with mitral apparatus/septal interaction, but no peak stress LVOT velocity obtain    Catheterization:      Holter:         Mendez Higuera MD    Portions of the record may have been created with voice recognition software.  Occasional wrong word or \"sound a like\" substitutions may have occurred due to the inherent limitations of voice recognition software.  Read the chart carefully and recognize, using context, where substitutions have occurred.  "

## 2024-03-26 NOTE — ASSESSMENT & PLAN NOTE
Sigmoid septum, mitral chordal redundancy, with exertional symptoms of dyspnea last summer, treated with addition of atenolol, dose reduction of losartan, and complete resolution of symptoms.  I reviewed his echo from 8/23 and his stress echo from 10/23 personally with him today and explained the hemodynamic cause of his symptoms.  There is no significant LVH otherwise, or EKG abnormality to suggest HCM, no further testing recommended

## 2024-03-26 NOTE — ASSESSMENT & PLAN NOTE
Currently well-controlled, better and more consistent blood pressures when using his wife's manual cuff.  Currently on losartan 25 mg, atenolol 25 mg.

## 2024-05-09 DIAGNOSIS — E11.9 TYPE 2 DIABETES MELLITUS WITHOUT COMPLICATION, WITHOUT LONG-TERM CURRENT USE OF INSULIN (HCC): ICD-10-CM

## 2024-05-09 NOTE — TELEPHONE ENCOUNTER
Medication: Metformin    Dose/Frequency: 1 tablet PO BID    Quantity: 180    Pharmacy: [facility] [address]    Office:   [x] PCP/Provider -   [] Speciality/Provider -     Does the patient have enough for 3 days?   [x] Yes   [] No - Send as HP to POD

## 2024-06-04 LAB
25(OH)D3 SERPL-MCNC: 46 NG/ML (ref 30–100)
ALBUMIN SERPL-MCNC: 4.5 G/DL (ref 3.6–5.1)
ALBUMIN/GLOB SERPL: 2 (CALC) (ref 1–2.5)
ALP SERPL-CCNC: 57 U/L (ref 35–144)
ALT SERPL-CCNC: 32 U/L (ref 9–46)
AST SERPL-CCNC: 23 U/L (ref 10–35)
BILIRUB SERPL-MCNC: 0.7 MG/DL (ref 0.2–1.2)
BUN SERPL-MCNC: 21 MG/DL (ref 7–25)
BUN/CREAT SERPL: ABNORMAL (CALC) (ref 6–22)
CALCIUM SERPL-MCNC: 9.2 MG/DL (ref 8.6–10.3)
CHLORIDE SERPL-SCNC: 104 MMOL/L (ref 98–110)
CHOLEST SERPL-MCNC: 156 MG/DL
CHOLEST/HDLC SERPL: 2.9 (CALC)
CO2 SERPL-SCNC: 30 MMOL/L (ref 20–32)
CREAT SERPL-MCNC: 1.06 MG/DL (ref 0.7–1.35)
EST. AVERAGE GLUCOSE BLD GHB EST-MCNC: 137 MG/DL
EST. AVERAGE GLUCOSE BLD GHB EST-SCNC: 7.6 MMOL/L
GFR/BSA.PRED SERPLBLD CYS-BASED-ARV: 79 ML/MIN/1.73M2
GLOBULIN SER CALC-MCNC: 2.3 G/DL (CALC) (ref 1.9–3.7)
GLUCOSE SERPL-MCNC: 111 MG/DL (ref 65–99)
HBA1C MFR BLD: 6.4 % OF TOTAL HGB
HDLC SERPL-MCNC: 53 MG/DL
LDLC SERPL CALC-MCNC: 80 MG/DL (CALC)
NONHDLC SERPL-MCNC: 103 MG/DL (CALC)
POTASSIUM SERPL-SCNC: 4.7 MMOL/L (ref 3.5–5.3)
PROT SERPL-MCNC: 6.8 G/DL (ref 6.1–8.1)
SODIUM SERPL-SCNC: 140 MMOL/L (ref 135–146)
TRIGL SERPL-MCNC: 135 MG/DL

## 2024-06-11 ENCOUNTER — OFFICE VISIT (OUTPATIENT)
Dept: FAMILY MEDICINE CLINIC | Facility: CLINIC | Age: 64
End: 2024-06-11
Payer: COMMERCIAL

## 2024-06-11 VITALS
DIASTOLIC BLOOD PRESSURE: 94 MMHG | BODY MASS INDEX: 26.29 KG/M2 | HEART RATE: 71 BPM | OXYGEN SATURATION: 98 % | TEMPERATURE: 96.2 F | RESPIRATION RATE: 16 BRPM | SYSTOLIC BLOOD PRESSURE: 136 MMHG | HEIGHT: 73 IN | WEIGHT: 198.4 LBS

## 2024-06-11 DIAGNOSIS — I10 PRIMARY HYPERTENSION: Primary | Chronic | ICD-10-CM

## 2024-06-11 DIAGNOSIS — E78.00 PURE HYPERCHOLESTEROLEMIA: ICD-10-CM

## 2024-06-11 DIAGNOSIS — E11.9 TYPE 2 DIABETES MELLITUS WITHOUT COMPLICATION, WITH LONG-TERM CURRENT USE OF INSULIN (HCC): ICD-10-CM

## 2024-06-11 DIAGNOSIS — Z12.5 SCREENING FOR PROSTATE CANCER: ICD-10-CM

## 2024-06-11 DIAGNOSIS — Q24.8 LEFT VENTRICULAR OUTFLOW TRACT OBSTRUCTION: ICD-10-CM

## 2024-06-11 DIAGNOSIS — R73.02 IGT (IMPAIRED GLUCOSE TOLERANCE): ICD-10-CM

## 2024-06-11 DIAGNOSIS — Z79.4 TYPE 2 DIABETES MELLITUS WITHOUT COMPLICATION, WITH LONG-TERM CURRENT USE OF INSULIN (HCC): ICD-10-CM

## 2024-06-11 PROCEDURE — 99214 OFFICE O/P EST MOD 30 MIN: CPT | Performed by: FAMILY MEDICINE

## 2024-06-11 RX ORDER — FLUOROURACIL 50 MG/G
CREAM TOPICAL DAILY
COMMUNITY
Start: 2024-05-23

## 2024-06-11 NOTE — ASSESSMENT & PLAN NOTE
- Continues to be well-controlled.  Patient with blood pressure log all under 130/80.  -Currently taking only atenolol 25 mg p.o. daily.  -Following with cardiology.  Continue current management.  -Continue low-salt, low-carb, low sugar, high-fiber, whole food eating.  -Discussed recommended activity level with a goal of 30 minutes moderate intensity exercise 5 days/week.  -Follow-up in 6 months.

## 2024-06-11 NOTE — ASSESSMENT & PLAN NOTE
Lab Results   Component Value Date    HGBA1C 6.4 (H) 06/04/2024     -Well-controlled but increased from previous.  Continues on metformin 500 mg twice daily.  - Discussed medication increases.  Discussed dietary and lifestyle modifications.  Patient will work on strict dietary changes and try to increase activity.  -Will recheck blood work in 6 months for review.

## 2024-06-11 NOTE — PROGRESS NOTES
Ambulatory Visit  Name: Rigo Tavarez      : 1960      MRN: [id number]  Encounter Provider: Brian Lee DO  Encounter Date: 2024   Encounter department: [facility]    Assessment & Plan   1. Primary hypertension  Assessment & Plan:  - Continues to be well-controlled.  Patient with blood pressure log all under 130/80.  -Currently taking only atenolol 25 mg p.o. daily.  -Following with cardiology.  Continue current management.  -Continue low-salt, low-carb, low sugar, high-fiber, whole food eating.  -Discussed recommended activity level with a goal of 30 minutes moderate intensity exercise 5 days/week.  -Follow-up in 6 months.    2. Type 2 diabetes mellitus without complication, with long-term current use of insulin ([facility])  Assessment & Plan:    Lab Results   Component Value Date    HGBA1C 6.4 (H) 2024     -Well-controlled but increased from previous.  Continues on metformin 500 mg twice daily.  - Discussed medication increases.  Discussed dietary and lifestyle modifications.  Patient will work on strict dietary changes and try to increase activity.  -Will recheck blood work in 6 months for review.  3. Left ventricular outflow tract obstruction  Assessment & Plan:  Following with cardiology.  Asymptomatic since starting atenolol.  4. Screening for prostate cancer  -     PSA, Total Screen; Future  5. IGT (impaired glucose tolerance)  -     Hemoglobin A1C; Future  -     Comprehensive metabolic panel; Future  6. Pure hypercholesterolemia  Assessment & Plan:  Continue current management with rosuvastatin 5 mg p.o. daily.  Fasting blood work ordered.  Orders:  -     Lipid Panel With Direct LDL; Future         History of Present Illness     Rigo is a 63-year-old male presents today for follow-up.  Notes overall he has been doing pretty well.  He has been taking all of his medications as prescribed.  He admits he has not been great with his diet recently.  He has been following  "with cardiology as recommended.  Since starting atenolol and reducing lisinopril his symptoms of dyspnea on exertion have resolved.  He is slowly getting back to increasing activity however does note he is getting more comfortable to walk a little bit longer.  Did complete colonoscopy this year.  He is sleeping well.  Getting about 7 to 8 hours a night without any symptoms of.  No other concerns today.      Review of Systems   Constitutional:  Negative for chills and fever.   HENT:  Negative for ear pain and sore throat.    Eyes:  Negative for pain and visual disturbance.   Respiratory:  Negative for cough and shortness of breath.    Cardiovascular:  Negative for chest pain and palpitations.   Gastrointestinal:  Negative for abdominal pain and vomiting.   Genitourinary:  Negative for dysuria and hematuria.   Musculoskeletal:  Negative for arthralgias and back pain.   Skin:  Negative for color change and rash.   Neurological:  Negative for seizures and syncope.   Psychiatric/Behavioral:  Negative for confusion and sleep disturbance. The patient is not nervous/anxious.    All other systems reviewed and are negative.    Past Medical History:   Diagnosis Date    Allergic     Colon polyp     Diabetes mellitus (HCC)     \"borderline\"    Hyperlipidemia     Hypertension     Joint pain     Laceration of lower leg     last assessed 06/06/13     Past Surgical History:   Procedure Laterality Date    TONSILLECTOMY       Family History   Problem Relation Age of Onset    No Known Problems Mother      Social History     Tobacco Use    Smoking status: Never    Smokeless tobacco: Never   Vaping Use    Vaping status: Never Used   Substance and Sexual Activity    Alcohol use: Yes     Alcohol/week: 2.0 standard drinks of alcohol     Types: 2 Cans of beer per week     Comment: occasional    Drug use: Never    Sexual activity: Not on file     Current Outpatient Medications on File Prior to Visit   Medication Sig    aspirin 81 MG tablet " Take 81 mg by mouth daily    atenolol (TENORMIN) 25 mg tablet Take 1 tablet (25 mg total) by mouth daily    cetirizine (ZyrTEC) 10 mg tablet Take 1 tablet (10 mg total) by mouth daily (Patient taking differently: Take 5 mg by mouth daily)    cholecalciferol (VITAMIN D3) 1,000 units tablet Take 2,000 Units by mouth daily    cyanocobalamin (VITAMIN B-12) 1,000 mcg tablet Take by mouth daily    Fish Oil-Cholecalciferol (FISH OIL + D3 PO) Take by mouth    fluorouracil (EFUDEX) 5 % cream Apply topically daily    fluticasone (FLONASE) 50 mcg/act nasal spray into each nostril    Glucosamine Sulfate 500 MG TABS     losartan (COZAAR) 25 mg tablet TAKE 1 TABLET (25 MG TOTAL) BY MOUTH DAILY.    mesalamine (ASACOL) 800 MG EC tablet Take 1 tablet (800 mg total) by mouth 3 (three) times a day    metFORMIN (GLUCOPHAGE) 500 mg tablet TAKE 1 TABLET BY MOUTH TWICE A DAY    Multiple Vitamins-Iron (QC DAILY MULTIVITAMINS/IRON) TABS Take by mouth    rosuvastatin (CRESTOR) 5 mg tablet TAKE 1 TABLET (5 MG TOTAL) BY MOUTH DAILY.     Allergies   Allergen Reactions    Penicillins     Seasonal Ic [Cholestatin]      Immunization History   Administered Date(s) Administered    COVID-19 Moderna Vac BIVALENT 12 Yr+ IM 0.5 ML 09/07/2022    COVID-19 PFIZER VACCINE 0.3 ML IM 03/18/2021, 04/08/2021, 10/30/2021    INFLUENZA 10/04/2017, 10/03/2018, 09/23/2021, 10/03/2022, 10/10/2023    Influenza Injectable, MDCK, Preservative Free, Quadrivalent, 0.5 mL 10/05/2020    Influenza Quadrivalent, 6-35 Months IM 10/04/2017    Influenza, injectable, quadrivalent, preservative free 0.5 mL 10/03/2018    Influenza, recombinant, quadrivalent,injectable, preservative free 09/17/2019    Influenza, seasonal, injectable 10/01/2015    Tdap 06/06/2013    Zoster Vaccine Recombinant 03/16/2022, 05/16/2022     Objective     /94 (BP Location: Left arm, Patient Position: Sitting, Cuff Size: Standard)   Pulse 71   Temp (!) 96.2 °F (35.7 °C) (Tympanic)   Resp 16   Ht  "6' 1\" (1.854 m)   Wt 90 kg (198 lb 6.4 oz)   SpO2 98%   BMI 26.18 kg/m²     Physical Exam  Vitals and nursing note reviewed.   Constitutional:       General: He is not in acute distress.     Appearance: Normal appearance.   HENT:      Head: Normocephalic and atraumatic.      Right Ear: Tympanic membrane and external ear normal.      Left Ear: Tympanic membrane and external ear normal.      Nose: Nose normal.      Mouth/Throat:      Mouth: Mucous membranes are moist.   Eyes:      Extraocular Movements: Extraocular movements intact.      Conjunctiva/sclera: Conjunctivae normal.      Pupils: Pupils are equal, round, and reactive to light.   Cardiovascular:      Rate and Rhythm: Normal rate and regular rhythm.      Pulses: Normal pulses.      Heart sounds: Normal heart sounds. No murmur heard.  Pulmonary:      Effort: Pulmonary effort is normal.      Breath sounds: Normal breath sounds. No wheezing, rhonchi or rales.   Abdominal:      General: Bowel sounds are normal.      Palpations: Abdomen is soft.      Tenderness: There is no abdominal tenderness. There is no guarding.   Musculoskeletal:         General: Normal range of motion.      Cervical back: Normal range of motion.      Right lower leg: No edema.      Left lower leg: No edema.   Lymphadenopathy:      Cervical: No cervical adenopathy.   Skin:     General: Skin is warm.      Capillary Refill: Capillary refill takes less than 2 seconds.   Neurological:      General: No focal deficit present.      Mental Status: He is alert and oriented to person, place, and time.   Psychiatric:         Mood and Affect: Mood normal.         Behavior: Behavior normal.       Administrative Statements   I have spent a total time of 34 minutes on 06/17/24 In caring for this patient including Diagnostic results, Prognosis, Risks and benefits of tx options, Instructions for management, Patient and family education, Importance of tx compliance, Counseling / Coordination of care, " Documenting in the medical record, and Reviewing / ordering tests, medicine, procedures  .

## 2024-06-19 ENCOUNTER — TELEPHONE (OUTPATIENT)
Dept: ADMINISTRATIVE | Facility: OTHER | Age: 64
End: 2024-06-19

## 2024-06-19 NOTE — TELEPHONE ENCOUNTER
----- Message from Prachi UNDERWOOD sent at 6/18/2024  3:46 PM EDT -----  Regarding: care gap request  06/18/24 3:46 PM    Hello, our patient attached above has had Diabetic Eye Exam completed/performed. Please assist in updating the patient chart by pulling the document from the Media Tab. The date of service is 1/11/2023.     Thank you,  Prachi SINGH

## 2024-06-19 NOTE — TELEPHONE ENCOUNTER
Upon review of the In Basket request we were able to locate, review, and update the patient chart as requested for Diabetic Eye Exam.    Any additional questions or concerns should be emailed to the Practice Liaisons via the appropriate education email address, please do not reply via In Basket.    Thank you  Nolberto Bahena MA   [facility]

## 2024-07-25 DIAGNOSIS — E78.00 PURE HYPERCHOLESTEROLEMIA: Chronic | ICD-10-CM

## 2024-07-25 DIAGNOSIS — R73.02 IGT (IMPAIRED GLUCOSE TOLERANCE): ICD-10-CM

## 2024-07-25 RX ORDER — ROSUVASTATIN CALCIUM 5 MG/1
5 TABLET, COATED ORAL DAILY
Qty: 100 TABLET | Refills: 1 | Status: SHIPPED | OUTPATIENT
Start: 2024-07-25

## 2024-09-12 ENCOUNTER — OFFICE VISIT (OUTPATIENT)
Dept: CARDIOLOGY CLINIC | Facility: CLINIC | Age: 64
End: 2024-09-12
Payer: COMMERCIAL

## 2024-09-12 VITALS
OXYGEN SATURATION: 96 % | WEIGHT: 200.8 LBS | BODY MASS INDEX: 26.61 KG/M2 | DIASTOLIC BLOOD PRESSURE: 88 MMHG | HEIGHT: 73 IN | SYSTOLIC BLOOD PRESSURE: 138 MMHG | HEART RATE: 67 BPM

## 2024-09-12 DIAGNOSIS — Q24.8 LEFT VENTRICULAR OUTFLOW TRACT OBSTRUCTION: ICD-10-CM

## 2024-09-12 DIAGNOSIS — I45.10 RBBB: ICD-10-CM

## 2024-09-12 DIAGNOSIS — I10 PRIMARY HYPERTENSION: Chronic | ICD-10-CM

## 2024-09-12 DIAGNOSIS — I47.10 SVT (SUPRAVENTRICULAR TACHYCARDIA): Primary | Chronic | ICD-10-CM

## 2024-09-12 DIAGNOSIS — E78.2 MIXED HYPERLIPIDEMIA: Chronic | ICD-10-CM

## 2024-09-12 PROCEDURE — 93000 ELECTROCARDIOGRAM COMPLETE: CPT | Performed by: INTERNAL MEDICINE

## 2024-09-12 PROCEDURE — 99214 OFFICE O/P EST MOD 30 MIN: CPT | Performed by: INTERNAL MEDICINE

## 2024-09-12 NOTE — PROGRESS NOTES
[facility]  Follow up note  Rigo Tavarez 64 y.o. adult MRN: [id number]      Assessment & Plan  SVT (supraventricular tachycardia)  No symptoms  Mixed hyperlipidemia  Well-controlled  Left ventricular outflow tract obstruction  Sigmoid septum, with mitral chordal redundancy with exertional dyspnea summer 2023 when I first met him treated with switching losartan to atenolol  Stress echo induced hyperdynamic peak stress LVEF with mitral chordal interaction with the septal wall, no specific gradient but examination physiologic maneuvers resulted in a systolic murmur with squat to stand and Valsalva  No clear evidence of hypertrophic cardiomyopathy  Primary hypertension  On atenolol, well-controlled  RBBB  Only notable structural findings was a sigmoid septum  This is a new conduction defect noted today, asymptomatic, low risk      Plan:    Annual follow-up  Bundle branch block in the context of prior fairly normal cardiovascular imaging and functional assessment, albeit with a sigmoid septum, benign and does not require any further testing    HPI:   Rigo Tavarez is a 64 y.o. year old adult who in the context of hypertension treated with losartan, with redundant chordal mitral apparatus developed symptoms of dyspnea with stress echo revealing increased prolapse of mitral chordal apparatus into the LVOT with flow acceleration by color Doppler but no peak stress pulse or continuous-wave Doppler done, but with positive physiological maneuvers in the office inducing a murmur with squat to stand and Valsalva, treated with switching his losartan to atenolol, encouraging increased fluid administration, with complete resolution of all symptoms.  His blood pressure is well-controlled on atenolol.  His cholesterol is well-controlled on rosuvastatin.      Review of Systems   Constitutional:  Negative for appetite change, diaphoresis, fatigue and fever.   Respiratory:  Negative for chest tightness, shortness of breath  "and wheezing.    Cardiovascular:  Negative for chest pain, palpitations and leg swelling.   Gastrointestinal:  Negative for abdominal pain and blood in stool.   Musculoskeletal:  Negative for arthralgias and joint swelling.   Skin:  Negative for rash.   Neurological:  Negative for dizziness, syncope and light-headedness.       Past Medical History:   Diagnosis Date    Allergic     Colon polyp     Diabetes mellitus (HCC)     \"borderline\"    Hyperlipidemia     Hypertension     Joint pain     Laceration of lower leg     last assessed 06/06/13     Social History     Substance and Sexual Activity   Alcohol Use Yes    Alcohol/week: 2.0 standard drinks of alcohol    Types: 2 Cans of beer per week    Comment: occasional     Social History     Substance and Sexual Activity   Drug Use Never     Social History     Tobacco Use   Smoking Status Never   Smokeless Tobacco Never       Allergies:  Allergies   Allergen Reactions    Penicillins     Seasonal Ic [Cholestatin]        Medications:     Current Outpatient Medications:     aspirin 81 MG tablet, Take 81 mg by mouth daily, Disp: , Rfl:     atenolol (TENORMIN) 25 mg tablet, Take 1 tablet (25 mg total) by mouth daily, Disp: 90 tablet, Rfl: 3    cetirizine (ZyrTEC) 10 mg tablet, Take 1 tablet (10 mg total) by mouth daily (Patient taking differently: Take 5 mg by mouth daily), Disp: 90 tablet, Rfl: 0    cholecalciferol (VITAMIN D3) 1,000 units tablet, Take 2,000 Units by mouth daily, Disp: , Rfl:     cyanocobalamin (VITAMIN B-12) 1,000 mcg tablet, Take by mouth daily, Disp: , Rfl:     Fish Oil-Cholecalciferol (FISH OIL + D3 PO), Take by mouth, Disp: , Rfl:     fluticasone (FLONASE) 50 mcg/act nasal spray, into each nostril, Disp: , Rfl:     Glucosamine Sulfate 500 MG TABS, Take 500 mg by mouth 3 times a week, Disp: , Rfl:     mesalamine (ASACOL) 800 MG EC tablet, Take 1 tablet (800 mg total) by mouth 3 (three) times a day, Disp: 270 tablet, Rfl: 3    metFORMIN (GLUCOPHAGE) 500 mg " tablet, TAKE 1 TABLET BY MOUTH TWICE A DAY, Disp: 180 tablet, Rfl: 1    Multiple Vitamins-Iron (QC DAILY MULTIVITAMINS/IRON) TABS, Take 1 tablet by mouth in the morning, Disp: , Rfl:     rosuvastatin (CRESTOR) 5 mg tablet, TAKE 1 TABLET (5 MG TOTAL) BY MOUTH DAILY., Disp: 100 tablet, Rfl: 1      Vitals:    09/12/24 0950   BP: 138/88   Pulse: 67   SpO2: 96%     Weight (last 2 days)       Date/Time Weight    09/12/24 0950 91.1 (200.8)     Weight: office weight at 09/12/24 0950    09/12/24 0949 88.5 (195)     Weight: home weight at 09/12/24 0949          Physical Exam  Constitutional:       General: He is not in acute distress.     Appearance: Normal appearance. He is not diaphoretic.   HENT:      Head: Normocephalic and atraumatic.   Eyes:      General: No scleral icterus.     Conjunctiva/sclera: Conjunctivae normal.   Neck:      Vascular: No JVD.   Cardiovascular:      Rate and Rhythm: Normal rate and regular rhythm.      Heart sounds: Normal heart sounds. No murmur heard.  Pulmonary:      Effort: Pulmonary effort is normal. No respiratory distress.      Breath sounds: Normal breath sounds. No wheezing, rhonchi or rales.   Musculoskeletal:         General: No tenderness.      Right lower leg: Normal. No edema.      Left lower leg: Normal. No edema.   Skin:     General: Skin is warm and dry.   Neurological:      Mental Status: He is alert. Mental status is at baseline.         Laboratory Studies:    Labs personally reviewed    Cardiac testing:     EKG reviewed personally:   No results found for this visit on 09/12/24.      Echocardiogram:  8/23-personally reviewed-EF normal, sigmoid septum, redundant mitral apparatus with mild prolapse into the LVOT during systole    Stress tests:  10/23-personally reviewed-EF normal, sigmoid septum, redundant mitral valve apparatus with small cavity at peak stress, with mitral apparatus/septal interaction, but no peak stress LVOT velocity obtain    Catheterization:      Holter:      "    Mendez Higuera MD    Portions of the record may have been created with voice recognition software.  Occasional wrong word or \"sound a like\" substitutions may have occurred due to the inherent limitations of voice recognition software.  Read the chart carefully and recognize, using context, where substitutions have occurred.  "

## 2024-09-12 NOTE — ASSESSMENT & PLAN NOTE
Sigmoid septum, with mitral chordal redundancy with exertional dyspnea summer 2023 when I first met him treated with switching losartan to atenolol  Stress echo induced hyperdynamic peak stress LVEF with mitral chordal interaction with the septal wall, no specific gradient but examination physiologic maneuvers resulted in a systolic murmur with squat to stand and Valsalva  No clear evidence of hypertrophic cardiomyopathy

## 2024-09-12 NOTE — ASSESSMENT & PLAN NOTE
Only notable structural findings was a sigmoid septum  This is a new conduction defect noted today, asymptomatic, low risk

## 2024-09-30 DIAGNOSIS — Q24.8 LEFT VENTRICULAR OUTFLOW TRACT OBSTRUCTION: ICD-10-CM

## 2024-09-30 RX ORDER — ATENOLOL 25 MG/1
25 TABLET ORAL DAILY
Qty: 90 TABLET | Refills: 1 | Status: SHIPPED | OUTPATIENT
Start: 2024-09-30

## 2024-11-08 DIAGNOSIS — E11.9 TYPE 2 DIABETES MELLITUS WITHOUT COMPLICATION, WITHOUT LONG-TERM CURRENT USE OF INSULIN (HCC): ICD-10-CM

## 2024-11-29 DIAGNOSIS — E78.00 PURE HYPERCHOLESTEROLEMIA: Chronic | ICD-10-CM

## 2024-11-29 DIAGNOSIS — R73.02 IGT (IMPAIRED GLUCOSE TOLERANCE): ICD-10-CM

## 2024-11-29 RX ORDER — ROSUVASTATIN CALCIUM 5 MG/1
5 TABLET, COATED ORAL DAILY
Qty: 90 TABLET | Refills: 1 | Status: SHIPPED | OUTPATIENT
Start: 2024-11-29

## 2024-12-11 ENCOUNTER — RESULTS FOLLOW-UP (OUTPATIENT)
Dept: FAMILY MEDICINE CLINIC | Facility: CLINIC | Age: 64
End: 2024-12-11

## 2024-12-11 LAB
ALBUMIN SERPL-MCNC: 4.5 G/DL (ref 3.6–5.1)
ALBUMIN/GLOB SERPL: 2.1 (CALC) (ref 1–2.5)
ALP SERPL-CCNC: 75 U/L (ref 35–144)
ALT SERPL-CCNC: 44 U/L (ref 9–46)
AST SERPL-CCNC: 31 U/L (ref 10–35)
BILIRUB SERPL-MCNC: 0.6 MG/DL (ref 0.2–1.2)
BUN SERPL-MCNC: 20 MG/DL (ref 7–25)
BUN/CREAT SERPL: ABNORMAL (CALC) (ref 6–22)
CALCIUM SERPL-MCNC: 9.7 MG/DL (ref 8.6–10.3)
CHLORIDE SERPL-SCNC: 106 MMOL/L (ref 98–110)
CHOLEST SERPL-MCNC: 146 MG/DL
CHOLEST/HDLC SERPL: 2.7 (CALC)
CO2 SERPL-SCNC: 31 MMOL/L (ref 20–32)
CREAT SERPL-MCNC: 1.11 MG/DL (ref 0.7–1.35)
GFR/BSA.PRED SERPLBLD CYS-BASED-ARV: 74 ML/MIN/1.73M2
GLOBULIN SER CALC-MCNC: 2.1 G/DL (CALC) (ref 1.9–3.7)
GLUCOSE SERPL-MCNC: 107 MG/DL (ref 65–99)
HBA1C MFR BLD: 6.1 % OF TOTAL HGB
HDLC SERPL-MCNC: 54 MG/DL
LDLC SERPL CALC-MCNC: 68 MG/DL (CALC)
LDLC SERPL DIRECT ASSAY-MCNC: 70 MG/DL
NONHDLC SERPL-MCNC: 92 MG/DL (CALC)
POTASSIUM SERPL-SCNC: 5 MMOL/L (ref 3.5–5.3)
PROT SERPL-MCNC: 6.6 G/DL (ref 6.1–8.1)
PSA SERPL-MCNC: 0.36 NG/ML
SODIUM SERPL-SCNC: 144 MMOL/L (ref 135–146)
TRIGL SERPL-MCNC: 166 MG/DL

## 2024-12-17 ENCOUNTER — OFFICE VISIT (OUTPATIENT)
Dept: FAMILY MEDICINE CLINIC | Facility: CLINIC | Age: 64
End: 2024-12-17
Payer: COMMERCIAL

## 2024-12-17 VITALS
SYSTOLIC BLOOD PRESSURE: 130 MMHG | WEIGHT: 191 LBS | TEMPERATURE: 96.6 F | RESPIRATION RATE: 16 BRPM | HEART RATE: 68 BPM | HEIGHT: 73 IN | DIASTOLIC BLOOD PRESSURE: 80 MMHG | OXYGEN SATURATION: 97 % | BODY MASS INDEX: 25.31 KG/M2

## 2024-12-17 DIAGNOSIS — I47.10 SVT (SUPRAVENTRICULAR TACHYCARDIA) (HCC): Chronic | ICD-10-CM

## 2024-12-17 DIAGNOSIS — Z00.00 ANNUAL PHYSICAL EXAM: ICD-10-CM

## 2024-12-17 DIAGNOSIS — R73.09 IMPAIRED GLUCOSE REGULATION: ICD-10-CM

## 2024-12-17 DIAGNOSIS — E78.2 MIXED HYPERLIPIDEMIA: Chronic | ICD-10-CM

## 2024-12-17 DIAGNOSIS — K50.119 CROHN'S DISEASE OF COLON WITH COMPLICATION (HCC): ICD-10-CM

## 2024-12-17 DIAGNOSIS — I10 PRIMARY HYPERTENSION: Primary | Chronic | ICD-10-CM

## 2024-12-17 DIAGNOSIS — N40.0 BENIGN PROSTATIC HYPERPLASIA WITHOUT LOWER URINARY TRACT SYMPTOMS: Chronic | ICD-10-CM

## 2024-12-17 PROCEDURE — 99214 OFFICE O/P EST MOD 30 MIN: CPT | Performed by: FAMILY MEDICINE

## 2024-12-17 PROCEDURE — 99396 PREV VISIT EST AGE 40-64: CPT | Performed by: FAMILY MEDICINE

## 2024-12-17 NOTE — ASSESSMENT & PLAN NOTE
Stable.  Continue current management with atenolol 25 mg p.o. daily.  Following with cardiology.

## 2024-12-17 NOTE — ASSESSMENT & PLAN NOTE
Hemoglobin A1c improved today to 6.1.  Continue working on dietary lifestyle modifications.  We will continue metformin 500 mg twice daily.

## 2024-12-17 NOTE — PATIENT INSTRUCTIONS
"Patient Education     Routine physical for adults   The Basics   Written by the doctors and editors at [facility]   What is a physical? -- A physical is a routine visit, or \"check-up,\" with your doctor. You might also hear it called a \"wellness visit\" or \"preventive visit.\"  During each visit, the doctor will:   Ask about your physical and mental health   Ask about your habits, behaviors, and lifestyle   Do an exam   Give you vaccines if needed   Talk to you about any medicines you take   Give advice about your health   Answer your questions  Getting regular check-ups is an important part of taking care of your health. It can help your doctor find and treat any problems you have. But it's also important for preventing health problems.  A routine physical is different from a \"sick visit.\" A sick visit is when you see a doctor because of a health concern or problem. Since physicals are scheduled ahead of time, you can think about what you want to ask the doctor.  How often should I get a physical? -- It depends on your age and health. In general, for people age 21 years and older:   If you are younger than 50 years, you might be able to get a physical every 3 years.   If you are 50 years or older, your doctor might recommend a physical every year.  If you have an ongoing health condition, like diabetes or high blood pressure, your doctor will probably want to see you more often.  What happens during a physical? -- In general, each visit will include:   Physical exam - The doctor or nurse will check your height, weight, heart rate, and blood pressure. They will also look at your eyes and ears. They will ask about how you are feeling and whether you have any symptoms that bother you.   Medicines - It's a good idea to bring a list of all the medicines you take to each doctor visit. Your doctor will talk to you about your medicines and answer any questions. Tell them if you are having any side effects that bother you. You " "should also tell them if you are having trouble paying for any of your medicines.   Habits and behaviors - This includes:   Your diet   Your exercise habits   Whether you smoke, drink alcohol, or use drugs   Whether you are sexually active   Whether you feel safe at home  Your doctor will talk to you about things you can do to improve your health and lower your risk of health problems. They will also offer help and support. For example, if you want to quit smoking, they can give you advice and might prescribe medicines. If you want to improve your diet or get more physical activity, they can help you with this, too.   Lab tests, if needed - The tests you get will depend on your age and situation. For example, your doctor might want to check your:   Cholesterol   Blood sugar   Iron level   Vaccines - The recommended vaccines will depend on your age, health, and what vaccines you already had. Vaccines are very important because they can prevent certain serious or deadly infections.   Discussion of screening - \"Screening\" means checking for diseases or other health problems before they cause symptoms. Your doctor can recommend screening based on your age, risk, and preferences. This might include tests to check for:   Cancer, such as breast, prostate, cervical, ovarian, colorectal, prostate, lung, or skin cancer   Sexually transmitted infections, such as chlamydia and gonorrhea   Mental health conditions like depression and anxiety  Your doctor will talk to you about the different types of screening tests. They can help you decide which screenings to have. They can also explain what the results might mean.   Answering questions - The physical is a good time to ask the doctor or nurse questions about your health. If needed, they can refer you to other doctors or specialists, too.  Adults older than 65 years often need other care, too. As you get older, your doctor will talk to you about:   How to prevent falling at " home   Hearing or vision tests   Memory testing   How to take your medicines safely   Making sure that you have the help and support you need at home  All topics are updated as new evidence becomes available and our peer review process is complete.  This topic retrieved from [employer] on: May 02, 2024.  Topic 847733 Version 1.0  Release: 32.4.3 - C32.122  © 2024 UpToDate, Inc. and/or its affiliates. All rights reserved.  Consumer Information Use and Disclaimer   Disclaimer: This generalized information is a limited summary of diagnosis, treatment, and/or medication information. It is not meant to be comprehensive and should be used as a tool to help the user understand and/or assess potential diagnostic and treatment options. It does NOT include all information about conditions, treatments, medications, side effects, or risks that may apply to a specific patient. It is not intended to be medical advice or a substitute for the medical advice, diagnosis, or treatment of a health care provider based on the health care provider's examination and assessment of a patient's specific and unique circumstances. Patients must speak with a health care provider for complete information about their health, medical questions, and treatment options, including any risks or benefits regarding use of medications. This information does not endorse any treatments or medications as safe, effective, or approved for treating a specific patient. UpToDate, Inc. and its affiliates disclaim any warranty or liability relating to this information or the use thereof.The use of this information is governed by the Terms of Use, available at https://www.woltersGupShupuwer.com/en/know/clinical-effectiveness-terms. 2024© UpToDate, Inc. and its affiliates and/or licensors. All rights reserved.  Copyright   © 2024 UpToDate, Inc. and/or its affiliates. All rights reserved.

## 2024-12-17 NOTE — PROGRESS NOTES
Adult Annual Physical  Name: Rigo Tavarez      : 1960      MRN: [id number]  Encounter Provider: Brian Lee DO  Encounter Date: 2024   Encounter department: [facility]    Assessment & Plan  Primary hypertension  Stable.  Continue current management with atenolol 25 mg p.o. daily.  Following with cardiology.       SVT (supraventricular tachycardia) (HCC)  -No recurrent symptoms.  Continues to follow with cardiology.  Continue current management with atenolol 25 mg p.o. daily.       Crohn's disease of colon with complication (HCC)  Well-controlled.  Continue mesalamine 800 mg 3 times daily.  His colonoscopy 2024 with plan to follow-up in 3 years.       Mixed hyperlipidemia  Stable.  Continue current management with rosuvastatin 5 mg p.o. daily.  Continue dietary and lifestyle modifications.       Impaired glucose regulation  Hemoglobin A1c improved today to 6.1.  Continue working on dietary lifestyle modifications.  We will continue metformin 500 mg twice daily.       Annual physical exam         Benign prostatic hyperplasia without lower urinary tract symptoms  Stable.  Asymptomatic.  PSA continues to be low.           Immunizations and preventive care screenings were discussed with patient today. Appropriate education was printed on patient's after visit summary.    Discussed risks and benefits of prostate cancer screening. We discussed the controversial history of PSA screening for prostate cancer in the [place] as well as the risk of over detection and over treatment of prostate cancer by way of PSA screening.  The patient understands that PSA blood testing is an imperfect way to screen for prostate cancer and that elevated PSA levels in the blood may also be caused by infection, inflammation, prostatic trauma or manipulation, urological procedures, or by benign prostatic enlargement.    The role of the digital rectal examination in prostate cancer  screening was also discussed and I discussed with him that there is large interobserver variability in the findings of digital rectal examination.    Counseling:  Alcohol/drug use: discussed moderation in alcohol intake, the recommendations for healthy alcohol use, and avoidance of illicit drug use.  Dental Health: discussed importance of regular tooth brushing, flossing, and dental visits.  Injury prevention: discussed safety/seat belts, safety helmets, smoke detectors, carbon monoxide detectors, and smoking near bedding or upholstery.  Sexual health: discussed sexually transmitted diseases, partner selection, use of condoms, avoidance of unintended pregnancy, and contraceptive alternatives.  Exercise: the importance of regular exercise/physical activity was discussed. Recommend exercise 3-5 times per week for at least 30 minutes.          History of Present Illness     Adult Annual Physical:  Patient presents for annual physical.     Diet and Physical Activity:  - Diet/Nutrition: consuming 3-5 servings of fruits/vegetables daily and well balanced diet.  - Exercise: walking.    Depression Screening:  - PHQ-2 Score: 0    General Health:  - Sleep: sleeps well and 7-8 hours of sleep on average.  - Hearing: normal hearing bilateral ears.  - Vision: goes for regular eye exams.  - Dental: brushes teeth twice daily.    /GYN Health:    - History of STDs: no     Health:  - History of STDs: no.     Advanced Care Planning:  - Has an advanced directive?: no    - Has a durable medical POA?: no    - ACP document given to patient?: no      Review of Systems   Constitutional:  Negative for chills and fever.   HENT:  Negative for ear pain and sore throat.    Eyes:  Negative for pain and visual disturbance.   Respiratory:  Negative for cough and shortness of breath.    Cardiovascular:  Negative for chest pain and palpitations.   Gastrointestinal:  Negative for abdominal pain and vomiting.   Genitourinary:  Negative for dysuria  "and hematuria.   Musculoskeletal:  Negative for arthralgias and back pain.   Skin:  Negative for color change and rash.   Neurological:  Negative for seizures and syncope.   Psychiatric/Behavioral:  Negative for confusion and sleep disturbance. The patient is not nervous/anxious.    All other systems reviewed and are negative.        Objective   /80 (BP Location: Left arm, Patient Position: Sitting, Cuff Size: Large)   Pulse 68   Temp (!) 96.6 °F (35.9 °C) (Tympanic)   Resp 16   Ht 6' 1.19\" (1.859 m)   Wt 86.6 kg (191 lb)   SpO2 97%   BMI 25.07 kg/m²     Physical Exam  Vitals and nursing note reviewed.   Constitutional:       General: He is not in acute distress.     Appearance: Normal appearance.   HENT:      Head: Normocephalic and atraumatic.      Right Ear: Tympanic membrane and external ear normal.      Left Ear: Tympanic membrane and external ear normal.      Nose: Nose normal.      Mouth/Throat:      Mouth: Mucous membranes are moist.   Eyes:      Extraocular Movements: Extraocular movements intact.      Conjunctiva/sclera: Conjunctivae normal.      Pupils: Pupils are equal, round, and reactive to light.   Cardiovascular:      Rate and Rhythm: Normal rate and regular rhythm.      Pulses: Normal pulses.      Heart sounds: Normal heart sounds. No murmur heard.  Pulmonary:      Effort: Pulmonary effort is normal.      Breath sounds: Normal breath sounds. No wheezing, rhonchi or rales.   Abdominal:      General: Bowel sounds are normal.      Palpations: Abdomen is soft.      Tenderness: There is no abdominal tenderness. There is no guarding.   Musculoskeletal:         General: Normal range of motion.      Cervical back: Normal range of motion.      Right lower leg: No edema.      Left lower leg: No edema.   Lymphadenopathy:      Cervical: No cervical adenopathy.   Skin:     General: Skin is warm.      Capillary Refill: Capillary refill takes less than 2 seconds.   Neurological:      General: No focal " deficit present.      Mental Status: He is alert and oriented to person, place, and time.   Psychiatric:         Mood and Affect: Mood normal.         Behavior: Behavior normal.

## 2024-12-17 NOTE — ASSESSMENT & PLAN NOTE
Stable.  Continue current management with rosuvastatin 5 mg p.o. daily.  Continue dietary and lifestyle modifications.

## 2024-12-17 NOTE — ASSESSMENT & PLAN NOTE
-No recurrent symptoms.  Continues to follow with cardiology.  Continue current management with atenolol 25 mg p.o. daily.

## 2024-12-17 NOTE — ASSESSMENT & PLAN NOTE
Well-controlled.  Continue mesalamine 800 mg 3 times daily.  His colonoscopy February 2024 with plan to follow-up in 3 years.

## 2025-04-06 DIAGNOSIS — Q24.8 LEFT VENTRICULAR OUTFLOW TRACT OBSTRUCTION: ICD-10-CM

## 2025-04-07 RX ORDER — ATENOLOL 25 MG/1
25 TABLET ORAL DAILY
Qty: 90 TABLET | Refills: 1 | Status: SHIPPED | OUTPATIENT
Start: 2025-04-07

## 2025-05-15 DIAGNOSIS — E11.9 TYPE 2 DIABETES MELLITUS WITHOUT COMPLICATION, WITHOUT LONG-TERM CURRENT USE OF INSULIN (HCC): ICD-10-CM

## 2025-06-09 ENCOUNTER — TELEPHONE (OUTPATIENT)
Age: 65
End: 2025-06-09

## 2025-06-09 DIAGNOSIS — E78.2 MIXED HYPERLIPIDEMIA: Primary | Chronic | ICD-10-CM

## 2025-06-09 DIAGNOSIS — R73.09 IMPAIRED GLUCOSE REGULATION: ICD-10-CM

## 2025-06-09 DIAGNOSIS — E11.9 TYPE 2 DIABETES MELLITUS WITHOUT COMPLICATION, WITHOUT LONG-TERM CURRENT USE OF INSULIN (HCC): ICD-10-CM

## 2025-06-09 DIAGNOSIS — Z12.5 SCREENING PSA (PROSTATE SPECIFIC ANTIGEN): ICD-10-CM

## 2025-06-09 LAB
LEFT EYE DIABETIC RETINOPATHY: POSITIVE
RIGHT EYE DIABETIC RETINOPATHY: POSITIVE

## 2025-06-09 NOTE — TELEPHONE ENCOUNTER
Patients wife Gillian calling requesting that Primary Care Provider reorder lab work for patient since he will be using [facility] labs. Also requesting that provider add in a PSA level. Please reach out to patient/wife when this is done so patient can schedule lab work prior to OV 7/2 with Primary Care Provider.

## 2025-06-21 DIAGNOSIS — R73.02 IGT (IMPAIRED GLUCOSE TOLERANCE): ICD-10-CM

## 2025-06-21 DIAGNOSIS — E78.00 PURE HYPERCHOLESTEROLEMIA: Chronic | ICD-10-CM

## 2025-06-22 RX ORDER — ROSUVASTATIN CALCIUM 5 MG/1
5 TABLET, COATED ORAL DAILY
Qty: 90 TABLET | Refills: 1 | Status: SHIPPED | OUTPATIENT
Start: 2025-06-22

## 2025-06-25 ENCOUNTER — APPOINTMENT (OUTPATIENT)
Dept: LAB | Age: 65
End: 2025-06-25
Attending: FAMILY MEDICINE
Payer: MEDICARE

## 2025-06-25 DIAGNOSIS — R73.09 IMPAIRED GLUCOSE REGULATION: ICD-10-CM

## 2025-06-25 DIAGNOSIS — Z12.5 SCREENING PSA (PROSTATE SPECIFIC ANTIGEN): ICD-10-CM

## 2025-06-25 DIAGNOSIS — E78.2 MIXED HYPERLIPIDEMIA: Chronic | ICD-10-CM

## 2025-06-25 LAB
ALBUMIN SERPL BCG-MCNC: 4.3 G/DL (ref 3.5–5)
ALP SERPL-CCNC: 57 U/L (ref 34–104)
ALT SERPL W P-5'-P-CCNC: 23 U/L (ref 7–52)
ANION GAP SERPL CALCULATED.3IONS-SCNC: 7 MMOL/L (ref 4–13)
AST SERPL W P-5'-P-CCNC: 22 U/L (ref 13–39)
BILIRUB SERPL-MCNC: 0.69 MG/DL (ref 0.2–1)
BUN SERPL-MCNC: 21 MG/DL (ref 5–25)
CALCIUM SERPL-MCNC: 9.1 MG/DL (ref 8.4–10.2)
CHLORIDE SERPL-SCNC: 104 MMOL/L (ref 96–108)
CHOLEST SERPL-MCNC: 131 MG/DL (ref ?–200)
CO2 SERPL-SCNC: 29 MMOL/L (ref 21–32)
CREAT SERPL-MCNC: 0.99 MG/DL (ref 0.6–1.3)
EST. AVERAGE GLUCOSE BLD GHB EST-MCNC: 137 MG/DL
GFR SERPL CREATININE-BSD FRML MDRD: 79 ML/MIN/1.73SQ M
GLUCOSE P FAST SERPL-MCNC: 100 MG/DL (ref 65–99)
HBA1C MFR BLD: 6.4 %
HDLC SERPL-MCNC: 47 MG/DL
LDLC SERPL CALC-MCNC: 58 MG/DL (ref 0–100)
POTASSIUM SERPL-SCNC: 4.3 MMOL/L (ref 3.5–5.3)
PROT SERPL-MCNC: 6.7 G/DL (ref 6.4–8.4)
PSA SERPL-MCNC: 0.4 NG/ML (ref 0–4)
SODIUM SERPL-SCNC: 140 MMOL/L (ref 135–147)
TRIGL SERPL-MCNC: 131 MG/DL (ref ?–150)

## 2025-06-25 PROCEDURE — 36415 COLL VENOUS BLD VENIPUNCTURE: CPT

## 2025-06-25 PROCEDURE — 83036 HEMOGLOBIN GLYCOSYLATED A1C: CPT

## 2025-06-25 PROCEDURE — 80061 LIPID PANEL: CPT

## 2025-06-25 PROCEDURE — G0103 PSA SCREENING: HCPCS

## 2025-06-25 PROCEDURE — 80053 COMPREHEN METABOLIC PANEL: CPT

## 2025-07-02 ENCOUNTER — OFFICE VISIT (OUTPATIENT)
Dept: FAMILY MEDICINE CLINIC | Facility: CLINIC | Age: 65
End: 2025-07-02
Payer: MEDICARE

## 2025-07-02 VITALS
TEMPERATURE: 96.2 F | OXYGEN SATURATION: 97 % | WEIGHT: 193.8 LBS | SYSTOLIC BLOOD PRESSURE: 120 MMHG | HEART RATE: 72 BPM | RESPIRATION RATE: 18 BRPM | DIASTOLIC BLOOD PRESSURE: 70 MMHG | BODY MASS INDEX: 25.69 KG/M2 | HEIGHT: 73 IN

## 2025-07-02 DIAGNOSIS — E78.2 MIXED HYPERLIPIDEMIA: ICD-10-CM

## 2025-07-02 DIAGNOSIS — Q24.8 LEFT VENTRICULAR OUTFLOW TRACT OBSTRUCTION: ICD-10-CM

## 2025-07-02 DIAGNOSIS — E11.9 TYPE 2 DIABETES MELLITUS WITHOUT COMPLICATION, WITHOUT LONG-TERM CURRENT USE OF INSULIN (HCC): Primary | ICD-10-CM

## 2025-07-02 DIAGNOSIS — K50.119 CROHN'S DISEASE OF COLON WITH COMPLICATION (HCC): ICD-10-CM

## 2025-07-02 DIAGNOSIS — I10 PRIMARY HYPERTENSION: Chronic | ICD-10-CM

## 2025-07-02 DIAGNOSIS — Z13.6 ENCOUNTER FOR ABDOMINAL AORTIC ANEURYSM (AAA) SCREENING: ICD-10-CM

## 2025-07-02 DIAGNOSIS — Z00.00 WELCOME TO MEDICARE PREVENTIVE VISIT: ICD-10-CM

## 2025-07-02 PROCEDURE — G2211 COMPLEX E/M VISIT ADD ON: HCPCS | Performed by: FAMILY MEDICINE

## 2025-07-02 PROCEDURE — G0402 INITIAL PREVENTIVE EXAM: HCPCS | Performed by: FAMILY MEDICINE

## 2025-07-02 PROCEDURE — G0403 EKG FOR INITIAL PREVENT EXAM: HCPCS | Performed by: FAMILY MEDICINE

## 2025-07-02 PROCEDURE — 99214 OFFICE O/P EST MOD 30 MIN: CPT | Performed by: FAMILY MEDICINE

## 2025-07-02 NOTE — ASSESSMENT & PLAN NOTE
Lab Results   Component Value Date    HGBA1C 6.4 (H) 06/25/2025     Well-controlled  Continue current management with metformin 500 mg twice daily  -Continue diet and lifestyle interventions  - Follow up in 6 months    Orders:  •  Hemoglobin A1C; Future  •  Comprehensive metabolic panel; Future  •  Albumin / creatinine urine ratio; Future

## 2025-07-02 NOTE — PATIENT INSTRUCTIONS
Medicare Preventive Visit Patient Instructions  Thank you for completing your Welcome to Medicare Visit or Medicare Annual Wellness Visit today. Your next wellness visit will be due in one year (7/3/2026).  The screening/preventive services that you may require over the next 5-10 years are detailed below. Some tests may not apply to you based off risk factors and/or age. Screening tests ordered at today's visit but not completed yet may show as past due. Also, please note that scanned in results may not display below.  Preventive Screenings:  Service Recommendations Previous Testing/Comments   Colorectal Cancer Screening  Colonoscopy    Fecal Occult Blood Test (FOBT)/Fecal Immunochemical Test (FIT)  Fecal DNA/Cologuard Test  Flexible Sigmoidoscopy Age: 45-75 years old   Colonoscopy: every 10 years (May be performed more frequently if at higher risk)  OR  FOBT/FIT: every 1 year  OR  Cologuard: every 3 years  OR  Sigmoidoscopy: every 5 years  Screening may be recommended earlier than age 45 if at higher risk for colorectal cancer. Also, an individualized decision between you and your healthcare provider will decide whether screening between the ages of 76-85 would be appropriate. Colonoscopy: 02/09/2024  FOBT/FIT: Not on file  Cologuard: Not on file  Sigmoidoscopy: 05/10/2021    Screening Current     Prostate Cancer Screening Individualized decision between patient and health care provider in men between ages of 55-69   Medicare will cover every 12 months beginning on the day after your 50th birthday PSA: 0.398 ng/mL     Screening Current     Hepatitis C Screening Once for adults born between 1945 and 1965  More frequently in patients at high risk for Hepatitis C Hep C Antibody: Not on file        Diabetes Screening 1-2 times per year if you're at risk for diabetes or have pre-diabetes Fasting glucose: 100 mg/dL (6/25/2025)  A1C: 6.4 % (6/25/2025)  Screening Not Indicated  History Diabetes   Cholesterol Screening Once  every 5 years if you don't have a lipid disorder. May order more often based on risk factors. Lipid panel: 06/25/2025  Screening Not Indicated  History Lipid Disorder      Other Preventive Screenings Covered by Medicare:  Abdominal Aortic Aneurysm (AAA) Screening: covered once if your at risk. You're considered to be at risk if you have a family history of AAA or a male between the age of 65-75 who smoking at least 100 cigarettes in your lifetime.  Lung Cancer Screening: covers low dose CT scan once per year if you meet all of the following conditions: (1) Age 55-77; (2) No signs or symptoms of lung cancer; (3) Current smoker or have quit smoking within the last 15 years; (4) You have a tobacco smoking history of at least 20 pack years (packs per day x number of years you smoked); (5) You get a written order from a healthcare provider.  Glaucoma Screening: covered annually if you're considered high risk: (1) You have diabetes OR (2) Family history of glaucoma OR (3)  aged 50 and older OR (4)  American aged 65 and older  Osteoporosis Screening: covered every 2 years if you meet one of the following conditions: (1) Have a vertebral abnormality; (2) On glucocorticoid therapy for more than 3 months; (3) Have primary hyperparathyroidism; (4) On osteoporosis medications and need to assess response to drug therapy.  HIV Screening: covered annually if you're between the age of 15-65. Also covered annually if you are younger than 15 and older than 65 with risk factors for HIV infection. For pregnant patients, it is covered up to 3 times per pregnancy.    Immunizations:  Immunization Recommendations   Influenza Vaccine Annual influenza vaccination during flu season is recommended for all persons aged >= 6 months who do not have contraindications   Pneumococcal Vaccine   * Pneumococcal conjugate vaccine = PCV13 (Prevnar 13), PCV15 (Vaxneuvance), PCV20 (Prevnar 20)  * Pneumococcal polysaccharide vaccine  = PPSV23 (Pneumovax) Adults 19-63 yo with certain risk factors or if 65+ yo  If never received any pneumonia vaccine: recommend Prevnar 20 (PCV20)  Give PCV20 if previously received 1 dose of PCV13 or PPSV23   Hepatitis B Vaccine 3 dose series if at intermediate or high risk (ex: diabetes, end stage renal disease, liver disease)   Respiratory syncytial virus (RSV) Vaccine - COVERED BY MEDICARE PART D  * RSVPreF3 (Arexvy) CDC recommends that adults 60 years of age and older may receive a single dose of RSV vaccine using shared clinical decision-making (SCDM)   Tetanus (Td) Vaccine - COST NOT COVERED BY MEDICARE PART B Following completion of primary series, a booster dose should be given every 10 years to maintain immunity against tetanus. Td may also be given as tetanus wound prophylaxis.   Tdap Vaccine - COST NOT COVERED BY MEDICARE PART B Recommended at least once for all adults. For pregnant patients, recommended with each pregnancy.   Shingles Vaccine (Shingrix) - COST NOT COVERED BY MEDICARE PART B  2 shot series recommended in those 19 years and older who have or will have weakened immune systems or those 50 years and older     Health Maintenance Due:      Topic Date Due   • Hepatitis C Screening  Never done   • HIV Screening  Never done   • Colorectal Cancer Screening  02/08/2027     Immunizations Due:      Topic Date Due   • Pneumococcal Vaccine: 50+ Years (1 of 2 - PCV) Never done   • COVID-19 Vaccine (5 - 2024-25 season) 09/01/2024   • Influenza Vaccine (1) 09/01/2025     Advance Directives   What are advance directives?  Advance directives are legal documents that state your wishes and plans for medical care. These plans are made ahead of time in case you lose your ability to make decisions for yourself. Advance directives can apply to any medical decision, such as the treatments you want, and if you want to donate organs.   What are the types of advance directives?  There are many types of advance  directives, and each state has rules about how to use them. You may choose a combination of any of the following:  Living will:  This is a written record of the treatment you want. You can also choose which treatments you do not want, which to limit, and which to stop at a certain time. This includes surgery, medicine, IV fluid, and tube feedings.   Durable power of  for healthcare (DPAHC):  This is a written record that states who you want to make healthcare choices for you when you are unable to make them for yourself. This person, called a proxy, is usually a family member or a friend. You may choose more than 1 proxy.  Do not resuscitate (DNR) order:  A DNR order is used in case your heart stops beating or you stop breathing. It is a request not to have certain forms of treatment, such as CPR. A DNR order may be included in other types of advance directives.  Medical directive:  This covers the care that you want if you are in a coma, near death, or unable to make decisions for yourself. You can list the treatments you want for each condition. Treatment may include pain medicine, surgery, blood transfusions, dialysis, IV or tube feedings, and a ventilator (breathing machine).  Values history:  This document has questions about your views, beliefs, and how you feel and think about life. This information can help others choose the care that you would choose.  Why are advance directives important?  An advance directive helps you control your care. Although spoken wishes may be used, it is better to have your wishes written down. Spoken wishes can be misunderstood, or not followed. Treatments may be given even if you do not want them. An advance directive may make it easier for your family to make difficult choices about your care.   Weight Management   Why it is important to manage your weight:  Being overweight increases your risk of health conditions such as heart disease, high blood pressure, type 2  diabetes, and certain types of cancer. It can also increase your risk for osteoarthritis, sleep apnea, and other respiratory problems. Aim for a slow, steady weight loss. Even a small amount of weight loss can lower your risk of health problems.  How to lose weight safely:  A safe and healthy way to lose weight is to eat fewer calories and get regular exercise. You can lose up about 1 pound a week by decreasing the number of calories you eat by 500 calories each day.   Healthy meal plan for weight management:  A healthy meal plan includes a variety of foods, contains fewer calories, and helps you stay healthy. A healthy meal plan includes the following:  Eat whole-grain foods more often.  A healthy meal plan should contain fiber. Fiber is the part of grains, fruits, and vegetables that is not broken down by your body. Whole-grain foods are healthy and provide extra fiber in your diet. Some examples of whole-grain foods are whole-wheat breads and pastas, oatmeal, brown rice, and bulgur.  Eat a variety of vegetables every day.  Include dark, leafy greens such as spinach, kale, palma greens, and mustard greens. Eat yellow and orange vegetables such as carrots, sweet potatoes, and winter squash.   Eat a variety of fruits every day.  Choose fresh or canned fruit (canned in its own juice or light syrup) instead of juice. Fruit juice has very little or no fiber.  Eat low-fat dairy foods.  Drink fat-free (skim) milk or 1% milk. Eat fat-free yogurt and low-fat cottage cheese. Try low-fat cheeses such as mozzarella and other reduced-fat cheeses.  Choose meat and other protein foods that are low in fat.  Choose beans or other legumes such as split peas or lentils. Choose fish, skinless poultry (chicken or turkey), or lean cuts of red meat (beef or pork). Before you cook meat or poultry, cut off any visible fat.   Use less fat and oil.  Try baking foods instead of frying them. Add less fat, such as margarine, sour cream,  regular salad dressing and mayonnaise to foods. Eat fewer high-fat foods. Some examples of high-fat foods include french fries, doughnuts, ice cream, and cakes.  Eat fewer sweets.  Limit foods and drinks that are high in sugar. This includes candy, cookies, regular soda, and sweetened drinks.  Exercise:  Exercise at least 30 minutes per day on most days of the week. Some examples of exercise include walking, biking, dancing, and swimming. You can also fit in more physical activity by taking the stairs instead of the elevator or parking farther away from stores. Ask your healthcare provider about the best exercise plan for you.    © Copyright [employer] 2018 Information is for End User's use only and may not be sold, redistributed or otherwise used for commercial purposes. All illustrations and images included in CareNotes® are the copyrighted property of [employer] or [employer]

## 2025-07-02 NOTE — PROGRESS NOTES
Name: Rigo Tavarez      : 1960      MRN: [id number]  Encounter Provider: Brian Lee DO  Encounter Date: 2025   Encounter department: [facility]  :  Assessment & Plan  Type 2 diabetes mellitus without complication, without long-term current use of insulin (HCC)    Lab Results   Component Value Date    HGBA1C 6.4 (H) 2025     Well-controlled  Continue current management with metformin 500 mg twice daily  -Continue diet and lifestyle interventions  - Follow up in 6 months    Orders:  •  Hemoglobin A1C; Future  •  Comprehensive metabolic panel; Future  •  Albumin / creatinine urine ratio; Future    Welcome to Medicare preventive visit    Orders:  •  POCT ECG    Crohn's disease of colon with complication (HCC)  Stable.  Continue mesalamine 800 mg 3 times daily.  Last colonoscopy 2024.  Due in .       Encounter for abdominal aortic aneurysm (AAA) screening    Orders:  •  US abdominal aorta; Future    Mixed hyperlipidemia  Continue current management with rosuvastatin 5 mg p.o. daily.  -Continue diet and lifestyle interventions  - Follow up in 6 months    Orders:  •  Lipid Panel with Direct LDL reflex; Future    Primary hypertension  Well-controlled.  Continue atenolol 25 mg p.o. daily.  With cardiology.       Left ventricular outflow tract obstruction  Following with cardiology.  Continue atenolol.          Preventive health issues were discussed with patient, and age appropriate screening tests were ordered as noted in patient's After Visit Summary. Personalized health advice and appropriate referrals for health education or preventive services given if needed, as noted in patient's After Visit Summary.    History of Present Illness     HPI   Patient Care Team:  Brian Lee DO as PCP - General (Family Medicine)  Patel Laws DO as PCP - PCP-[facility] Attributed-Roster    Review of Systems   Constitutional:  Negative for chills and fever.   HENT:   Negative for ear pain and sore throat.    Eyes:  Negative for pain and visual disturbance.   Respiratory:  Negative for cough and shortness of breath.    Cardiovascular:  Negative for chest pain and palpitations.   Gastrointestinal:  Negative for abdominal pain and vomiting.   Genitourinary:  Negative for dysuria and hematuria.   Musculoskeletal:  Negative for arthralgias and back pain.   Skin:  Negative for color change and rash.   Neurological:  Negative for seizures and syncope.   Psychiatric/Behavioral:  Negative for confusion, sleep disturbance and suicidal ideas. The patient is not nervous/anxious.    All other systems reviewed and are negative.    Medical History Reviewed by provider this encounter:  Tobacco  Allergies  Meds  Problems  Med Hx  Surg Hx  Fam Hx       Annual Wellness Visit Questionnaire   Rigo is here for his Welcome to Medicare visit.     Health Risk Assessment:   Patient rates overall health as good. Patient feels that their physical health rating is same. Patient is satisfied with their life. Eyesight was rated as same. Hearing was rated as same. Patient feels that their emotional and mental health rating is same. Patients states they are never, rarely angry. Patient states they are never, rarely unusually tired/fatigued. Pain experienced in the last 7 days has been none. Patient states that he has experienced no weight loss or gain in last 6 months.     Depression Screening:   PHQ-2 Score: 0      Fall Risk Screening:   In the past year, patient has experienced: no history of falling in past year      Home Safety:  Patient does not have trouble with stairs inside or outside of their home. Patient has working smoke alarms and has working carbon monoxide detector. Home safety hazards include: none.     Nutrition:   Current diet is Regular.     Medications:   Patient is currently taking over-the-counter supplements. OTC medications include: see medication list. Patient is able to  manage medications.     Activities of Daily Living (ADLs)/Instrumental Activities of Daily Living (IADLs):   Walk and transfer into and out of bed and chair?: Yes  Dress and groom yourself?: Yes    Bathe or shower yourself?: Yes    Feed yourself? Yes  Do your laundry/housekeeping?: Yes  Manage your money, pay your bills and track your expenses?: Yes  Make your own meals?: Yes    Do your own shopping?: Yes    Previous Hospitalizations:   Any hospitalizations or ED visits within the last 12 months?: No      Advance Care Planning:   Living will: Yes    Durable POA for healthcare: Yes    Advanced directive: Yes    End of Life Decisions reviewed with patient: Yes    Provider agrees with end of life decisions: Yes      Preventive Screenings      Cardiovascular Screening:    General: Screening Not Indicated and History Lipid Disorder      Diabetes Screening:     General: Screening Not Indicated and History Diabetes      Colorectal Cancer Screening:     General: Screening Current      Prostate Cancer Screening:    General: Screening Current      Abdominal Aortic Aneurysm (AAA) Screening:    Risk factors include: age between 65-74 yo        Lung Cancer Screening:     General: Screening Not Indicated      Hepatitis C Screening:    General: Screening Not Indicated    Immunizations:  - Immunizations due: Prevnar 20  - Risks/benefits immunizations discussed      Screening, Brief Intervention, and Referral to Treatment (SBIRT)     Screening  Typical number of drinks in a day: 0  Typical number of drinks in a week: 2  Interpretation: Low risk drinking behavior.    Single Item Drug Screening:  How often have you used an illegal drug (including marijuana) or a prescription medication for non-medical reasons in the past year? never    Single Item Drug Screen Score: 0  Interpretation: Negative screen for possible drug use disorder    Other Counseling Topics:   Calcium and vitamin D intake.     Social Drivers of Health     Food  "Insecurity: No Food Insecurity (7/2/2025)    Nursing - Inadequate Food Risk Classification    • Worried About Running Out of Food in the Last Year: Never true    • Ran Out of Food in the Last Year: Never true   Transportation Needs: No Transportation Needs (7/2/2025)    PRAPARE - Transportation    • Lack of Transportation (Medical): No    • Lack of Transportation (Non-Medical): No   Housing Stability: Low Risk  (7/2/2025)    Housing Stability Vital Sign    • Unable to Pay for Housing in the Last Year: No    • Number of Times Moved in the Last Year: 0    • Homeless in the Last Year: No   Utilities: Not At Risk (7/2/2025)    [facility] Utilities    • Threatened with loss of utilities: No     Vision Screening    Right eye Left eye Both eyes   Without correction      With correction 20\50 20/20 20/20       Objective   /70 (BP Location: Left arm, Patient Position: Sitting, Cuff Size: Standard)   Pulse 72   Temp (!) 96.2 °F (35.7 °C) (Tympanic)   Resp 18   Ht 6' 1.1\" (1.857 m)   Wt 87.9 kg (193 lb 12.8 oz)   SpO2 97%   BMI 25.50 kg/m²     Physical Exam  Vitals and nursing note reviewed.   Constitutional:       General: He is not in acute distress.     Appearance: Normal appearance.   HENT:      Head: Normocephalic and atraumatic.      Right Ear: Tympanic membrane and external ear normal.      Left Ear: Tympanic membrane and external ear normal.      Nose: Nose normal.      Mouth/Throat:      Mouth: Mucous membranes are moist.     Eyes:      Extraocular Movements: Extraocular movements intact.      Conjunctiva/sclera: Conjunctivae normal.      Pupils: Pupils are equal, round, and reactive to light.       Cardiovascular:      Rate and Rhythm: Normal rate and regular rhythm.      Pulses: Normal pulses.      Heart sounds: Normal heart sounds. No murmur heard.  Pulmonary:      Effort: Pulmonary effort is normal.      Breath sounds: Normal breath sounds. No wheezing, rhonchi or rales.   Abdominal:      General: Bowel " sounds are normal.      Palpations: Abdomen is soft.      Tenderness: There is no abdominal tenderness. There is no guarding.     Musculoskeletal:         General: Normal range of motion.      Cervical back: Normal range of motion.      Right lower leg: No edema.      Left lower leg: No edema.   Lymphadenopathy:      Cervical: No cervical adenopathy.     Skin:     General: Skin is warm.      Capillary Refill: Capillary refill takes less than 2 seconds.     Neurological:      General: No focal deficit present.      Mental Status: He is alert and oriented to person, place, and time.     Psychiatric:         Mood and Affect: Mood normal.         Behavior: Behavior normal.

## 2025-07-02 NOTE — ASSESSMENT & PLAN NOTE
Continue current management with rosuvastatin 5 mg p.o. daily.  -Continue diet and lifestyle interventions  - Follow up in 6 months    Orders:  •  Lipid Panel with Direct LDL reflex; Future

## 2025-07-02 NOTE — ASSESSMENT & PLAN NOTE
Stable.  Continue mesalamine 800 mg 3 times daily.  Last colonoscopy February 2024.  Due in 2027.

## 2025-07-09 ENCOUNTER — HOSPITAL ENCOUNTER (OUTPATIENT)
Dept: RADIOLOGY | Age: 65
Discharge: HOME | End: 2025-07-09
Attending: FAMILY MEDICINE
Payer: MEDICARE

## 2025-07-09 DIAGNOSIS — Z13.6 ENCOUNTER FOR ABDOMINAL AORTIC ANEURYSM (AAA) SCREENING: ICD-10-CM

## 2025-07-09 PROCEDURE — 76775 US EXAM ABDO BACK WALL LIM: CPT
